# Patient Record
Sex: MALE | Race: WHITE | NOT HISPANIC OR LATINO | Employment: OTHER | URBAN - METROPOLITAN AREA
[De-identification: names, ages, dates, MRNs, and addresses within clinical notes are randomized per-mention and may not be internally consistent; named-entity substitution may affect disease eponyms.]

---

## 2018-09-15 ENCOUNTER — HOSPITAL ENCOUNTER (EMERGENCY)
Facility: HOSPITAL | Age: 43
Discharge: HOME | End: 2018-09-16
Attending: EMERGENCY MEDICINE
Payer: OTHER GOVERNMENT

## 2018-09-15 DIAGNOSIS — F43.20 ADJUSTMENT DISORDER, UNSPECIFIED TYPE: Primary | ICD-10-CM

## 2018-09-15 DIAGNOSIS — F43.22 ADJUSTMENT DISORDER WITH ANXIETY: ICD-10-CM

## 2018-09-15 DIAGNOSIS — Z86.59 HISTORY OF SUICIDAL IDEATION: ICD-10-CM

## 2018-09-15 LAB
ALBUMIN SERPL BCP-MCNC: 4.4 G/DL
ALP SERPL-CCNC: 82 U/L
ALT SERPL W/O P-5'-P-CCNC: 109 U/L
AMORPH CRY URNS QL MICRO: ABNORMAL
AMPHET+METHAMPHET UR QL: NEGATIVE
ANION GAP SERPL CALC-SCNC: 16 MMOL/L
APAP SERPL-MCNC: <3 UG/ML
AST SERPL-CCNC: 66 U/L
BACTERIA #/AREA URNS HPF: ABNORMAL /HPF
BARBITURATES UR QL SCN>200 NG/ML: NEGATIVE
BASOPHILS # BLD AUTO: 0.1 K/UL
BASOPHILS NFR BLD: 0.9 %
BENZODIAZ UR QL SCN>200 NG/ML: NEGATIVE
BILIRUB SERPL-MCNC: 1 MG/DL
BILIRUB UR QL STRIP: NEGATIVE
BUN SERPL-MCNC: 8 MG/DL
BZE UR QL SCN: NEGATIVE
CALCIUM SERPL-MCNC: 9.4 MG/DL
CANNABINOIDS UR QL SCN: NEGATIVE
CHLORIDE SERPL-SCNC: 107 MMOL/L
CLARITY UR: CLEAR
CO2 SERPL-SCNC: 17 MMOL/L
COLOR UR: YELLOW
CREAT SERPL-MCNC: 0.9 MG/DL
CREAT UR-MCNC: 250.3 MG/DL
DIFFERENTIAL METHOD: ABNORMAL
EOSINOPHIL # BLD AUTO: 0.2 K/UL
EOSINOPHIL NFR BLD: 2.8 %
ERYTHROCYTE [DISTWIDTH] IN BLOOD BY AUTOMATED COUNT: 12.6 %
EST. GFR  (AFRICAN AMERICAN): >60 ML/MIN/1.73 M^2
EST. GFR  (NON AFRICAN AMERICAN): >60 ML/MIN/1.73 M^2
ETHANOL SERPL-MCNC: 136 MG/DL
ETHANOL SERPL-MCNC: 78 MG/DL
GLUCOSE SERPL-MCNC: 127 MG/DL
GLUCOSE UR QL STRIP: NEGATIVE
GRAN CASTS #/AREA URNS LPF: 2 /LPF
HCT VFR BLD AUTO: 50.6 %
HGB BLD-MCNC: 17.4 G/DL
HGB UR QL STRIP: NEGATIVE
HYALINE CASTS #/AREA URNS LPF: 45 /LPF
KETONES UR QL STRIP: NEGATIVE
LEUKOCYTE ESTERASE UR QL STRIP: NEGATIVE
LYMPHOCYTES # BLD AUTO: 3.2 K/UL
LYMPHOCYTES NFR BLD: 36.1 %
MCH RBC QN AUTO: 32.5 PG
MCHC RBC AUTO-ENTMCNC: 34.5 G/DL
MCV RBC AUTO: 94 FL
METHADONE UR QL SCN>300 NG/ML: NEGATIVE
MICROSCOPIC COMMENT: ABNORMAL
MONOCYTES # BLD AUTO: 0.6 K/UL
MONOCYTES NFR BLD: 6.2 %
NEUTROPHILS # BLD AUTO: 4.9 K/UL
NEUTROPHILS NFR BLD: 54 %
NITRITE UR QL STRIP: NEGATIVE
OPIATES UR QL SCN: NEGATIVE
PCP UR QL SCN>25 NG/ML: NEGATIVE
PH UR STRIP: 6 [PH] (ref 5–8)
PLATELET # BLD AUTO: 293 K/UL
PMV BLD AUTO: 7.9 FL
POTASSIUM SERPL-SCNC: 4 MMOL/L
PROT SERPL-MCNC: 7.9 G/DL
PROT UR QL STRIP: ABNORMAL
RBC # BLD AUTO: 5.36 M/UL
RBC #/AREA URNS HPF: 2 /HPF (ref 0–4)
SODIUM SERPL-SCNC: 140 MMOL/L
SP GR UR STRIP: 1.02 (ref 1–1.03)
TOXICOLOGY INFORMATION: NORMAL
TSH SERPL DL<=0.005 MIU/L-ACNC: 0.72 UIU/ML
URN SPEC COLLECT METH UR: ABNORMAL
UROBILINOGEN UR STRIP-ACNC: NEGATIVE EU/DL
WBC # BLD AUTO: 9 K/UL
WBC #/AREA URNS HPF: 2 /HPF (ref 0–5)

## 2018-09-15 PROCEDURE — 85025 COMPLETE CBC W/AUTO DIFF WBC: CPT

## 2018-09-15 PROCEDURE — 80329 ANALGESICS NON-OPIOID 1 OR 2: CPT

## 2018-09-15 PROCEDURE — 25000003 PHARM REV CODE 250: Performed by: EMERGENCY MEDICINE

## 2018-09-15 PROCEDURE — 80307 DRUG TEST PRSMV CHEM ANLYZR: CPT

## 2018-09-15 PROCEDURE — 36415 COLL VENOUS BLD VENIPUNCTURE: CPT

## 2018-09-15 PROCEDURE — 82375 ASSAY CARBOXYHB QUANT: CPT

## 2018-09-15 PROCEDURE — 80053 COMPREHEN METABOLIC PANEL: CPT

## 2018-09-15 PROCEDURE — 93005 ELECTROCARDIOGRAM TRACING: CPT

## 2018-09-15 PROCEDURE — S4991 NICOTINE PATCH NONLEGEND: HCPCS | Performed by: EMERGENCY MEDICINE

## 2018-09-15 PROCEDURE — 99284 EMERGENCY DEPT VISIT MOD MDM: CPT | Mod: 25

## 2018-09-15 PROCEDURE — 81000 URINALYSIS NONAUTO W/SCOPE: CPT

## 2018-09-15 PROCEDURE — 84443 ASSAY THYROID STIM HORMONE: CPT

## 2018-09-15 PROCEDURE — 80320 DRUG SCREEN QUANTALCOHOLS: CPT

## 2018-09-15 RX ORDER — LORAZEPAM 1 MG/1
1 TABLET ORAL
Status: COMPLETED | OUTPATIENT
Start: 2018-09-15 | End: 2018-09-15

## 2018-09-15 RX ORDER — IBUPROFEN 200 MG
1 TABLET ORAL
Status: DISCONTINUED | OUTPATIENT
Start: 2018-09-15 | End: 2018-09-16 | Stop reason: HOSPADM

## 2018-09-15 RX ADMIN — LORAZEPAM 1 MG: 1 TABLET ORAL at 10:09

## 2018-09-15 RX ADMIN — NICOTINE 1 PATCH: 21 PATCH, EXTENDED RELEASE TRANSDERMAL at 10:09

## 2018-09-15 NOTE — ED NOTES
Faxed packet to [facility], [facility], [facility], [facility], [facility], [facility], [facility], [facility], [facility], [facility]

## 2018-09-15 NOTE — ED NOTES
Pt is now more calm and cooperative, pt is answering questions and interacting with ER Staff ad breana.

## 2018-09-15 NOTE — ED NOTES
Faxed packet to  [facility], [facility], [facility], [facility], [facility], [facility], [facility], [facility], [facility], [facility], [facility], [facility], [facility],

## 2018-09-15 NOTE — ED NOTES
PEC packet faxed to: [facility], [facility], [facility], [facility],  [facility], [facility],  [facility]

## 2018-09-15 NOTE — ED NOTES
Assumed care from Tristan:  Patient awake, alert and oriented x 3, skin warm and dry, in NAD with family at bedside.

## 2018-09-15 NOTE — ED PROVIDER NOTES
"Encounter Date: 9/15/2018    SCRIBE #1 NOTE: I, Renetta Beth , am scribing for, and in the presence of, .       History     Chief Complaint   Patient presents with    Suicide Attempt     Placed a garden hose in vehicle last night in an attempt to commit suicide.        Time seen by provider: 11:52 AM on 09/15/2018    Oliver Vargas is a 42 y.o. male with HTN who presents to the ED via [place] [employer] for a mental health evaluation. Patient states he "did not want to wake up" and attempted to commit suicide by putting a hose in his vehicle. The  report they found him in the car and the patient stated "I want to go inside and end it all", therefore police restrained him and he was brought to the ED. Patient is largely unwilling to participate In conversation and thus HPI and ROS are limited. Wife called the ED at 12:39 and states she is unsure who called the police, the patient does not wish to disclose any information to her.         The history is provided by the patient, the police and the spouse. No  was used.     Review of patient's allergies indicates:  No Known Allergies  Past Medical History:   Diagnosis Date    Hypertension      No past surgical history on file.  No family history on file.  Social History     Tobacco Use    Smoking status: Not on file   Substance Use Topics    Alcohol use: Not on file    Drug use: Not on file     Review of Systems   Unable to perform ROS: Psychiatric disorder   Psychiatric/Behavioral: Positive for dysphoric mood and suicidal ideas.       Physical Exam     Initial Vitals   BP Pulse Resp Temp SpO2   09/15/18 1145 09/15/18 1145 09/15/18 1144 09/15/18 1144 09/15/18 1144   (!) 195/130 (!) 138 20 98.3 °F (36.8 °C) 99 %      MAP       --                Physical Exam    Nursing note and vitals reviewed.  Constitutional: He appears well-developed and well-nourished.  Non-toxic appearance. No distress.   HENT:   Head: Normocephalic and " atraumatic.   Eyes: EOM are normal. Pupils are equal, round, and reactive to light.   Neck: Normal range of motion. Neck supple. No neck rigidity. No JVD present.   Cardiovascular: Normal rate, regular rhythm, normal heart sounds and intact distal pulses. Exam reveals no gallop and no friction rub.    No murmur heard.  Pulmonary/Chest: Breath sounds normal. He has no wheezes. He has no rhonchi. He has no rales.   Abdominal: Soft. Bowel sounds are normal. He exhibits no distension. There is no tenderness. There is no rigidity, no rebound and no guarding.   Musculoskeletal: Normal range of motion.   Neurological: He is alert and oriented to person, place, and time. He has normal strength and normal reflexes. No cranial nerve deficit or sensory deficit. He exhibits normal muscle tone. Coordination normal. GCS eye subscore is 4. GCS verbal subscore is 5. GCS motor subscore is 6.   Skin: Skin is warm and dry.   Psychiatric: He has a normal mood and affect. His speech is normal and behavior is normal. He is not actively hallucinating.         ED Course   Procedures  Labs Reviewed   CBC W/ AUTO DIFFERENTIAL - Abnormal; Notable for the following components:       Result Value    MCH 32.5 (*)     MPV 7.9 (*)     All other components within normal limits   COMPREHENSIVE METABOLIC PANEL - Abnormal; Notable for the following components:    CO2 17 (*)     Glucose 127 (*)     AST 66 (*)      (*)     All other components within normal limits   URINALYSIS, REFLEX TO URINE CULTURE - Abnormal; Notable for the following components:    Protein, UA 1+ (*)     All other components within normal limits    Narrative:     Preferred Collection Type->Urine, Clean Catch   ALCOHOL,MEDICAL (ETHANOL) - Abnormal; Notable for the following components:    Alcohol, Medical, Serum 136 (*)     All other components within normal limits   ACETAMINOPHEN LEVEL - Abnormal; Notable for the following components:    Acetaminophen (Tylenol), Serum <3.0  (*)     All other components within normal limits   URINALYSIS MICROSCOPIC - Abnormal; Notable for the following components:    Bacteria, UA Few (*)     Hyaline Casts, UA 45 (*)     Granular Casts, UA 2 (*)     All other components within normal limits    Narrative:     Preferred Collection Type->Urine, Clean Catch   ALCOHOL,MEDICAL (ETHANOL) - Abnormal; Notable for the following components:    Alcohol, Medical, Serum 78 (*)     All other components within normal limits   TSH   DRUG SCREEN PANEL, URINE EMERGENCY    Narrative:     Preferred Collection Type->Urine, Clean Catch   CARBON MONOXIDE, BLOOD     EKG Readings: (Independently Interpreted)   Sinus tachycardia. Rate of 103. Possible Left atrial enlargement. Normal axis. Normal intervals.        Imaging Results    None          Medical Decision Making:   History:   Old Medical Records: I decided to obtain old medical records.  Initial Assessment:   This is an emergent evaluation for psychiatric evaluation.  The pt presents for evaluation of suicide attempt by taking garden hose to connect to his car and run it through the window.    I feel the pt is suicidal and pt was placed under PEC with direct observation.  Medical workup was initiated to evaluate for organic etiologies.  Pt's etoh level was elevated, repeat alcohol was normal.  I do not believe the pt has metabolic encephalopathy, CVA, severe electrolyte derrangement, drug induced temporary psychosis.  He has no hypoxia, he is not short of breath.  He has been resting and sleeping comfortably.  Patient is medically clear for transfer to psychiatric facility    Clinical Tests:   Lab Tests: Ordered and Reviewed  Medical Tests: Ordered and Reviewed  ED Management:  On reassessment patient states he is no longer feeling suicidal.  He was intoxicated and does not remember well last night.  He was awoken by the police officers this morning when they came into his house.  It seems that they drove by the house and  Sol garden hose stocking to the automobile exhaust.  The patient was handcuffed and brought to the hospital for possible suicide attempt.  Patient denies wanting to commit suicide now that he is sober.  I will obtain a psychiatric consult for further evaluation.            Scribe Attestation:   Scribe #1: I performed the above scribed service and the documentation accurately describes the services I performed. I attest to the accuracy of the note.    I, Selvin Humphrey, personally performed the services described in this documentation. All medical record entries made by the scribe were at my direction and in my presence.  I have reviewed the chart and agree that the record reflects my personal performance and is accurate and complete. Darryn Montalvo MD.  4:00 PM 09/15/2018             Clinical Impression:   The encounter diagnosis was Suicide attempt.                             Darryn Montalvo MD  09/15/18 [id number]       Darryn Montalvo MD  09/15/18 6937

## 2018-09-16 VITALS
TEMPERATURE: 98 F | HEIGHT: 70 IN | DIASTOLIC BLOOD PRESSURE: 100 MMHG | HEART RATE: 110 BPM | RESPIRATION RATE: 20 BRPM | SYSTOLIC BLOOD PRESSURE: 138 MMHG | WEIGHT: 220 LBS | BODY MASS INDEX: 31.5 KG/M2 | OXYGEN SATURATION: 97 %

## 2018-09-16 PROCEDURE — G0425 INPT/ED TELECONSULT30: HCPCS | Mod: GT,,, | Performed by: PSYCHIATRY & NEUROLOGY

## 2018-09-16 PROCEDURE — 99499 UNLISTED E&M SERVICE: CPT | Mod: GT,,, | Performed by: PSYCHIATRY & NEUROLOGY

## 2018-09-16 NOTE — ED NOTES
Admit packet refaxed to the following facilities: [facility], [facility], [facility], [facility], [facility], [facility], [facility], [facility], [facility], [facility], [facility], [facility], [facility], [facility], [facility], [facility], [facility], [facility], [facility], [facility], [facility], [facility], [facility], [facility], [facility], [facility], [facility], [facility], [facility], [facility] and [facility].
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
## 2018-09-17 LAB — COHGB MFR BLD: 5 %

## 2020-02-13 ENCOUNTER — OFFICE VISIT (OUTPATIENT)
Dept: FAMILY MEDICINE | Facility: CLINIC | Age: 45
End: 2020-02-13
Payer: OTHER GOVERNMENT

## 2020-02-13 VITALS
DIASTOLIC BLOOD PRESSURE: 90 MMHG | BODY MASS INDEX: 34.22 KG/M2 | OXYGEN SATURATION: 96 % | SYSTOLIC BLOOD PRESSURE: 124 MMHG | HEIGHT: 70 IN | HEART RATE: 116 BPM | WEIGHT: 239 LBS

## 2020-02-13 DIAGNOSIS — I10 ESSENTIAL HYPERTENSION: ICD-10-CM

## 2020-02-13 DIAGNOSIS — M77.8 LEFT SHOULDER TENDINITIS: Primary | ICD-10-CM

## 2020-02-13 DIAGNOSIS — Z00.00 PREVENTATIVE HEALTH CARE: ICD-10-CM

## 2020-02-13 PROCEDURE — 99203 OFFICE O/P NEW LOW 30 MIN: CPT | Mod: S$GLB,,, | Performed by: NURSE PRACTITIONER

## 2020-02-13 PROCEDURE — 99203 PR OFFICE/OUTPT VISIT, NEW, LEVL III, 30-44 MIN: ICD-10-PCS | Mod: S$GLB,,, | Performed by: NURSE PRACTITIONER

## 2020-02-13 RX ORDER — LISINOPRIL 10 MG/1
10 TABLET ORAL DAILY
Qty: 30 TABLET | Refills: 0 | Status: SHIPPED | OUTPATIENT
Start: 2020-02-13 | End: 2020-03-10 | Stop reason: SDUPTHER

## 2020-02-13 RX ORDER — METHYLPREDNISOLONE 4 MG/1
TABLET ORAL
Qty: 1 PACKAGE | Refills: 0 | Status: SHIPPED | OUTPATIENT
Start: 2020-02-13 | End: 2020-03-05

## 2020-02-13 NOTE — PATIENT INSTRUCTIONS
Shoulder Pain with Uncertain Cause  Shoulder pain can have many causes. Pain often comes from the structures that surround the shoulder joint. These are the joint capsule, ligaments, tendons, muscles, and bursa. Pain can also come from cartilage in the joint. Cartilage can become worn out or injured. Its important to know whats causing your pain so the healthcare provider can use the correct treatment. But sometimes its difficult to find the exact cause of shoulder pain. You may need to see a specialist (orthopedist). You may also need special tests such as a CT scan or MRI. The provider may need to use special tools to look inside the joint (arthroscopy).  Shoulder pain can be treated with a sling or a device that keeps your shoulder from moving. You can take an anti-inflammatory medicine such as ibuprofen to ease pain. You may need to do special shoulder exercises. Follow up with a specialist if the pain is severe or doesnt go away after a few weeks.  Home care  Follow these tips when caring for yourself at home:  · If a sling was given to you, leave it in place for the time advised by your healthcare provider. If you arent sure how long to wear it, ask for advice. If the sling becomes loose, adjust it so that your forearm is level with the ground. Your shoulder should feel well supported.  · Put an ice pack on the injured area for 20 minutes every 1 to 2 hours the first day. You can make your own ice pack by putting ice cubes in a plastic bag. Wrap the bag in a thin towel. Continue with ice packs 3 to 4 times a day for the next 2 days. Then use the pack as needed to ease pain and swelling.  · You may use acetaminophen or ibuprofen to control pain, unless another pain medicine was prescribed. If you have chronic liver or kidney disease, talk with your healthcare provider before using these medicines. Also talk with your provider if youve ever had a stomach ulcer or GI bleeding.  · Shoulder pain may seem  worse at night, when there is less to distract you from the pain. If you sleep on your side, try to keep weight off your painful shoulder. Propping pillows behind you may stop you from rolling over onto that shoulder during sleep.   · Shoulder and elbow joints can become stiff if left in a sling for too long. You should start range of motion exercises about 7 to 10 days after the injury. Talk with your provider to find out what type of exercises to do and how soon to start.  · You can take the sling off to shower or bathe.  Follow-up care  Follow up with your healthcare provider if you dont start to get better in the next 5 days.  When to seek medical advice  Call your healthcare provider right away if any of these occur:  · Pain or swelling gets worse or continues for more than a few days  · Your hand or fingers become cold, blue, numb, or tingly  · Large amount of bruising on your shoulder or upper arm  · Difficulty moving your hand or fingers  · Weakness in your hand or fingers  · Your shoulder becomes stiff  · It feels like your shoulder is popping out  · You are less able to do your daily activities  Date Last Reviewed: 10/1/2016  © 5313-8918 [employer]. [address] [place] [address], [place] [address]. All rights reserved. This information is not intended as a substitute for professional medical care. Always follow your healthcare professional's instructions.

## 2020-02-13 NOTE — PROGRESS NOTES
SUBJECTIVE:      Patient ID: Oliver Vargas is a 44 y.o. male.    Chief Complaint: Shoulder Pain (lt)    Mr Vargas is here today complaining of left shoulder pain for 3 weeks. He does not remember injuring his shoulder just woke up with it hurting. [facility] but has not been [facility] in over a year. He is retired. Smokes cigarettes daily. Says he was told in the past he has borderline hypertension but was not treated for it    Shoulder Pain    The pain is present in the left shoulder. This is a new problem. The current episode started 1 to 4 weeks ago. The problem occurs daily. The problem has been gradually worsening. The pain is mild. Associated symptoms include a limited range of motion. Pertinent negatives include no headaches, inability to bear weight, stiffness or visual symptoms. The symptoms are aggravated by activity. He has tried nothing for the symptoms.       History reviewed. No pertinent surgical history.  History reviewed. No pertinent family history.   Social History     Socioeconomic History    Marital status:      Spouse name: Not on file    Number of children: Not on file    Years of education: Not on file    Highest education level: Not on file   Occupational History    Not on file   Social Needs    Financial resource strain: Not on file    Food insecurity:     Worry: Not on file     Inability: Not on file    Transportation needs:     Medical: Not on file     Non-medical: Not on file   Tobacco Use    Smoking status: Current Every Day Smoker    Smokeless tobacco: Never Used   Substance and Sexual Activity    Alcohol use: Yes    Drug use: Never    Sexual activity: Yes   Lifestyle    Physical activity:     Days per week: Not on file     Minutes per session: Not on file    Stress: Not on file   Relationships    Social connections:     Talks on phone: Not on file     Gets together: Not on file     Attends Protestant service: Not on file      "Active member of club or organization: Not on file     Attends meetings of clubs or organizations: Not on file     Relationship status: Not on file   Other Topics Concern    Not on file   Social History Narrative    Not on file     Current Outpatient Medications   Medication Sig Dispense Refill    lisinopril 10 MG tablet Take 1 tablet (10 mg total) by mouth once daily. 1/2 tab x7d then 1 tab 30 tablet 0    methylPREDNISolone (MEDROL DOSEPACK) 4 mg tablet use as directed 1 Package 0     No current facility-administered medications for this visit.      Review of patient's allergies indicates:  No Known Allergies   Past Medical History:   Diagnosis Date    Hypertension      History reviewed. No pertinent surgical history.    Review of Systems   Constitutional: Negative for appetite change, chills, diaphoresis and unexpected weight change.   HENT: Negative for ear discharge, facial swelling, hearing loss, nosebleeds and trouble swallowing.    Eyes: Negative for photophobia, pain and visual disturbance.   Respiratory: Negative for apnea, choking, shortness of breath and wheezing.    Cardiovascular: Negative for chest pain and palpitations.   Gastrointestinal: Negative for abdominal pain, blood in stool and vomiting.   Endocrine: Negative for polyphagia.   Genitourinary: Negative for difficulty urinating and hematuria.   Musculoskeletal: Positive for arthralgias (shoulder pain). Negative for gait problem, joint swelling and stiffness.   Skin: Negative for pallor.   Neurological: Negative for dizziness, seizures, speech difficulty, weakness, light-headedness and headaches.   Hematological: Does not bruise/bleed easily.   Psychiatric/Behavioral: Negative for agitation and confusion.      OBJECTIVE:      Vitals:    02/13/20 1034 02/13/20 1049   BP: (!) 126/90 (!) 124/90   Pulse: (!) 116    SpO2: 96%    Weight: 108.4 kg (239 lb)    Height: 5' 10" (1.778 m)      Physical Exam   Constitutional: He is oriented to person, " place, and time. He appears well-developed and well-nourished.   HENT:   Head: Atraumatic.   Mouth/Throat: Uvula is midline.   Eyes: Conjunctivae are normal.   Neck: Neck supple. No thyromegaly present.   Cardiovascular: Normal rate, regular rhythm, normal heart sounds and intact distal pulses.   Pulmonary/Chest: Effort normal and breath sounds normal.   Abdominal: Soft. Bowel sounds are normal. He exhibits no distension. There is no tenderness.   Musculoskeletal: He exhibits no edema.        Left shoulder: He exhibits decreased range of motion and tenderness.   Neck: FROM without pain, No pain with axial compression.   Shoulder with painful ROM. No deformities, strength normal, no vascular compromise. Negative empty can test     Neurological: He is alert and oriented to person, place, and time.   Skin: Skin is warm and dry.   Psychiatric: He has a normal mood and affect.   Nursing note and vitals reviewed.     Assessment:       1. Left shoulder tendinitis    2. Essential hypertension    3. Preventative health care        Plan:       Left shoulder tendinitis  -     methylPREDNISolone (MEDROL DOSEPACK) 4 mg tablet; use as directed  Dispense: 1 Package; Refill: 0    Essential hypertension  -     lisinopril 10 MG tablet; Take 1 tablet (10 mg total) by mouth once daily. 1/2 tab x7d then 1 tab  Dispense: 30 tablet; Refill: 0  -     Comprehensive metabolic panel; Future; Expected date: 02/13/2020  -     Lipid panel; Future; Expected date: 02/13/2020  -     TSH; Future; Expected date: 02/13/2020  -     Urinalysis; Future; Expected date: 02/13/2020    Preventative health care  -     CBC auto differential; Future; Expected date: 02/13/2020  -     Comprehensive metabolic panel; Future; Expected date: 02/13/2020  -     Lipid panel; Future; Expected date: 02/13/2020  -     TSH; Future; Expected date: 02/13/2020  -     Urinalysis; Future; Expected date: 02/13/2020        Follow up in about 4 weeks (around 3/12/2020) for htn .       2/13/2020 Dawit Ayon, APRN, FNP

## 2020-02-19 ENCOUNTER — TELEPHONE (OUTPATIENT)
Dept: FAMILY MEDICINE | Facility: CLINIC | Age: 45
End: 2020-02-19

## 2020-02-19 LAB
ALBUMIN SERPL-MCNC: 4.7 G/DL (ref 4–5)
ALBUMIN/GLOB SERPL: 1.8 {RATIO} (ref 1.2–2.2)
ALP SERPL-CCNC: 101 IU/L (ref 39–117)
ALT SERPL-CCNC: 401 IU/L (ref 0–44)
APPEARANCE UR: CLEAR
AST SERPL-CCNC: 217 IU/L (ref 0–40)
BASOPHILS # BLD AUTO: 0.1 X10E3/UL (ref 0–0.2)
BASOPHILS NFR BLD AUTO: 0 %
BILIRUB SERPL-MCNC: 0.9 MG/DL (ref 0–1.2)
BILIRUB UR QL STRIP: NEGATIVE
BUN SERPL-MCNC: 13 MG/DL (ref 6–24)
BUN/CREAT SERPL: 14 (ref 9–20)
CALCIUM SERPL-MCNC: 9.4 MG/DL (ref 8.7–10.2)
CHLORIDE SERPL-SCNC: 97 MMOL/L (ref 96–106)
CHOLEST SERPL-MCNC: 191 MG/DL (ref 100–199)
CO2 SERPL-SCNC: 20 MMOL/L (ref 20–29)
COLOR UR: YELLOW
CREAT SERPL-MCNC: 0.91 MG/DL (ref 0.76–1.27)
EOSINOPHIL # BLD AUTO: 0.2 X10E3/UL (ref 0–0.4)
EOSINOPHIL NFR BLD AUTO: 2 %
ERYTHROCYTE [DISTWIDTH] IN BLOOD BY AUTOMATED COUNT: 13.9 % (ref 11.6–15.4)
GLOBULIN SER CALC-MCNC: 2.6 G/DL (ref 1.5–4.5)
GLUCOSE SERPL-MCNC: 184 MG/DL (ref 65–99)
GLUCOSE UR QL: NEGATIVE
HCT VFR BLD AUTO: 52.3 % (ref 37.5–51)
HDLC SERPL-MCNC: 28 MG/DL
HGB BLD-MCNC: 18 G/DL (ref 13–17.7)
HGB UR QL STRIP: NEGATIVE
IMM GRANULOCYTES # BLD AUTO: 0.1 X10E3/UL (ref 0–0.1)
IMM GRANULOCYTES NFR BLD AUTO: 1 %
KETONES UR QL STRIP: NEGATIVE
LDLC SERPL CALC-MCNC: 130 MG/DL (ref 0–99)
LEUKOCYTE ESTERASE UR QL STRIP: NEGATIVE
LYMPHOCYTES # BLD AUTO: 2.6 X10E3/UL (ref 0.7–3.1)
LYMPHOCYTES NFR BLD AUTO: 20 %
MCH RBC QN AUTO: 33 PG (ref 26.6–33)
MCHC RBC AUTO-ENTMCNC: 34.4 G/DL (ref 31.5–35.7)
MCV RBC AUTO: 96 FL (ref 79–97)
MICRO URNS: NORMAL
MONOCYTES # BLD AUTO: 1.2 X10E3/UL (ref 0.1–0.9)
MONOCYTES NFR BLD AUTO: 10 %
NEUTROPHILS # BLD AUTO: 8.5 X10E3/UL (ref 1.4–7)
NEUTROPHILS NFR BLD AUTO: 67 %
NITRITE UR QL STRIP: NEGATIVE
PH UR STRIP: 6.5 [PH] (ref 5–7.5)
PLATELET # BLD AUTO: 243 X10E3/UL (ref 150–450)
POTASSIUM SERPL-SCNC: 4.4 MMOL/L (ref 3.5–5.2)
PROT SERPL-MCNC: 7.3 G/DL (ref 6–8.5)
PROT UR QL STRIP: NEGATIVE
RBC # BLD AUTO: 5.45 X10E6/UL (ref 4.14–5.8)
SODIUM SERPL-SCNC: 136 MMOL/L (ref 134–144)
SP GR UR: 1.02 (ref 1–1.03)
SPECIMEN STATUS REPORT: NORMAL
TRIGL SERPL-MCNC: 164 MG/DL (ref 0–149)
TSH SERPL DL<=0.005 MIU/L-ACNC: 1.25 UIU/ML (ref 0.45–4.5)
UROBILINOGEN UR STRIP-MCNC: 1 MG/DL (ref 0.2–1)
VLDLC SERPL CALC-MCNC: 33 MG/DL (ref 5–40)
WBC # BLD AUTO: 12.7 X10E3/UL (ref 3.4–10.8)

## 2020-02-19 NOTE — TELEPHONE ENCOUNTER
----- Message from Dawit Ayon NP sent at 2/19/2020  7:51 AM CST -----  Can we add an A1c, GGT, hepatitis panel, iron and ferritin

## 2020-02-22 LAB
FERRITIN SERPL-MCNC: NORMAL NG/ML
GGT SERPL-CCNC: NORMAL IU/L
HAV IGM SERPL QL IA: NORMAL
HBA1C MFR BLD: 7.1 % (ref 4.8–5.6)
HBV CORE IGM SERPL QL IA: NORMAL
HBV SURFACE AG SERPL QL IA: NORMAL
HCV AB S/CO SERPL IA: NORMAL
IRON SERPL-MCNC: NORMAL UG/DL
SPECIMEN STATUS REPORT: NORMAL

## 2020-02-24 ENCOUNTER — TELEPHONE (OUTPATIENT)
Dept: FAMILY MEDICINE | Facility: CLINIC | Age: 45
End: 2020-02-24

## 2020-02-24 DIAGNOSIS — R71.8 ELEVATED HEMATOCRIT: ICD-10-CM

## 2020-02-24 DIAGNOSIS — D58.2 ELEVATED HEMOGLOBIN: ICD-10-CM

## 2020-02-24 DIAGNOSIS — R79.89 ELEVATED LIVER FUNCTION TESTS: Primary | ICD-10-CM

## 2020-02-24 NOTE — TELEPHONE ENCOUNTER
----- Message from Dawit Ayon NP sent at 2/24/2020  7:46 AM CST -----  elevated liver function tests.  This can be caused by alcohol, certain medications, certain infections, and fatty infiltration of the liver.  NO alcohol, hydrate, no tylenol. In one week he needs a hepatic function panel, hep panel, ggt, iron an ferritin.

## 2020-03-04 ENCOUNTER — TELEPHONE (OUTPATIENT)
Dept: FAMILY MEDICINE | Facility: CLINIC | Age: 45
End: 2020-03-04

## 2020-03-04 DIAGNOSIS — D58.2 ELEVATED FERRITIN, HEMOGLOBIN, AND RED BLOOD CELL COUNT: Primary | ICD-10-CM

## 2020-03-04 DIAGNOSIS — R71.8 ELEVATED FERRITIN, HEMOGLOBIN, AND RED BLOOD CELL COUNT: Primary | ICD-10-CM

## 2020-03-04 DIAGNOSIS — R79.89 ELEVATED FERRITIN, HEMOGLOBIN, AND RED BLOOD CELL COUNT: Primary | ICD-10-CM

## 2020-03-04 LAB
ALBUMIN SERPL-MCNC: 4.1 G/DL (ref 4–5)
ALP SERPL-CCNC: 94 IU/L (ref 39–117)
ALT SERPL-CCNC: 121 IU/L (ref 0–44)
AST SERPL-CCNC: 55 IU/L (ref 0–40)
BILIRUB DIRECT SERPL-MCNC: 0.12 MG/DL (ref 0–0.4)
BILIRUB SERPL-MCNC: 0.3 MG/DL (ref 0–1.2)
FERRITIN SERPL-MCNC: 865 NG/ML (ref 30–400)
GGT SERPL-CCNC: 218 IU/L (ref 0–65)
HAV IGM SERPL QL IA: NEGATIVE
HBV CORE IGM SERPL QL IA: NEGATIVE
HBV SURFACE AG SERPL QL IA: NEGATIVE
HCV AB S/CO SERPL IA: <0.1 S/CO RATIO (ref 0–0.9)
IRON SERPL-MCNC: 81 UG/DL (ref 38–169)
PROT SERPL-MCNC: 7.1 G/DL (ref 6–8.5)

## 2020-03-04 NOTE — TELEPHONE ENCOUNTER
----- Message from Dawit Ayon NP sent at 3/4/2020  8:52 AM CST -----  Please let pt know we are adding additional labs due to abnormal results. Can you call the lab to add a hemochromatosis DNA analysis? And did they run the full hepatic function panel?

## 2020-03-04 NOTE — TELEPHONE ENCOUNTER
Lab could not abd the hemochromatosis lab. They did have results on the full hepatic function panel and are sending it over.

## 2020-03-10 ENCOUNTER — OFFICE VISIT (OUTPATIENT)
Dept: FAMILY MEDICINE | Facility: CLINIC | Age: 45
End: 2020-03-10
Payer: OTHER GOVERNMENT

## 2020-03-10 VITALS
BODY MASS INDEX: 34.22 KG/M2 | HEART RATE: 82 BPM | SYSTOLIC BLOOD PRESSURE: 120 MMHG | DIASTOLIC BLOOD PRESSURE: 80 MMHG | WEIGHT: 239 LBS | HEIGHT: 70 IN | OXYGEN SATURATION: 98 %

## 2020-03-10 DIAGNOSIS — R79.89 ELEVATED FERRITIN: ICD-10-CM

## 2020-03-10 DIAGNOSIS — I10 ESSENTIAL HYPERTENSION: ICD-10-CM

## 2020-03-10 DIAGNOSIS — E78.00 ELEVATED LDL CHOLESTEROL LEVEL: ICD-10-CM

## 2020-03-10 DIAGNOSIS — Z72.0 TOBACCO USE: ICD-10-CM

## 2020-03-10 DIAGNOSIS — R74.8 ELEVATED LIVER ENZYMES: ICD-10-CM

## 2020-03-10 DIAGNOSIS — E11.9 TYPE 2 DIABETES MELLITUS WITHOUT COMPLICATION, WITHOUT LONG-TERM CURRENT USE OF INSULIN: Primary | ICD-10-CM

## 2020-03-10 PROCEDURE — 99214 PR OFFICE/OUTPT VISIT, EST, LEVL IV, 30-39 MIN: ICD-10-PCS | Mod: S$GLB,,, | Performed by: NURSE PRACTITIONER

## 2020-03-10 PROCEDURE — 99214 OFFICE O/P EST MOD 30 MIN: CPT | Mod: S$GLB,,, | Performed by: NURSE PRACTITIONER

## 2020-03-10 RX ORDER — METFORMIN HYDROCHLORIDE 500 MG/1
500 TABLET, EXTENDED RELEASE ORAL
Qty: 90 TABLET | Refills: 0 | Status: SHIPPED | OUTPATIENT
Start: 2020-03-10 | End: 2020-06-10 | Stop reason: SDUPTHER

## 2020-03-10 RX ORDER — VARENICLINE TARTRATE 0.5 (11)-1
KIT ORAL
Qty: 1 PACKAGE | Refills: 0 | Status: SHIPPED | OUTPATIENT
Start: 2020-03-10 | End: 2020-06-10

## 2020-03-10 RX ORDER — LISINOPRIL 10 MG/1
10 TABLET ORAL DAILY
Qty: 90 TABLET | Refills: 0 | Status: SHIPPED | OUTPATIENT
Start: 2020-03-10 | End: 2020-06-10 | Stop reason: SDUPTHER

## 2020-03-10 NOTE — PROGRESS NOTES
SUBJECTIVE:      Patient ID: Oliver Vargas is a 44 y.o. male.    Chief Complaint: discuss lab results    Mr Guerra is here today to f/u on HTN and review labs. [facility] but has not been [facility] in over a year. He is retired. Smokes cigarettes daily. He has quit smoking in the past with chantix but started back 2 years ago. He is interested in quitting smoking. Liver enzymes were elevated on his labs, he says he is a recovering alcoholic and has been alcohol free for over a month. Work up on elevated liver enzymes revealed an elevated ferritin.  I tried to add a hemochromatosis DNA analysis, however the lab was unable to run it. His sugar was also elevated, A1c is 7.1. Family history of diabetes. He does not follow a healthy diet and does not exercise routinely.     Nicotine Dependence   Presents for initial visit. Symptoms include fatigue. Preferred tobacco types include cigarettes. Preferred cigarette types include filtered. Preferred strength is ultra light. Preferred brands include Cedar. His urge triggers include company of smokers. His first smoke is from 6 to 8 AM. He smokes 1 pack of cigarettes per day. He started smoking when he was >18 years old. Past treatments include varenicline. The treatment provided significant relief. Compliance with prior treatments has been good. Oliver has tried to quit 2 times. His past medical history is significant for alcohol abuse.   Diabetes   He presents for his initial diabetic visit. He has type 2 diabetes mellitus. His disease course has been stable. There are no hypoglycemic associated symptoms. Pertinent negatives for hypoglycemia include no confusion, pallor, seizures or speech difficulty. Associated symptoms include fatigue. Pertinent negatives for diabetes include no chest pain and no polyphagia. There are no hypoglycemic complications. Symptoms are stable. Risk factors for coronary artery disease include hypertension,  male sex, obesity, dyslipidemia, diabetes mellitus and family history. When asked about current treatments, none were reported.   Hypertension   This is a new problem. The current episode started 1 to 4 weeks ago. The problem is controlled. Pertinent negatives include no chest pain, palpitations, peripheral edema or shortness of breath. Risk factors for coronary artery disease include diabetes mellitus, dyslipidemia, obesity, male gender and family history. Past treatments include nothing.       History reviewed. No pertinent surgical history.  Family History   Problem Relation Age of Onset    Hypertension Mother     Diabetes type II Father     Heart disease Father       Social History     Socioeconomic History    Marital status:      Spouse name: Not on file    Number of children: Not on file    Years of education: Not on file    Highest education level: Not on file   Occupational History    Not on file   Social Needs    Financial resource strain: Not on file    Food insecurity:     Worry: Not on file     Inability: Not on file    Transportation needs:     Medical: Not on file     Non-medical: Not on file   Tobacco Use    Smoking status: Current Every Day Smoker    Smokeless tobacco: Never Used   Substance and Sexual Activity    Alcohol use: Yes    Drug use: Never    Sexual activity: Yes   Lifestyle    Physical activity:     Days per week: Not on file     Minutes per session: Not on file    Stress: Not on file   Relationships    Social connections:     Talks on phone: Not on file     Gets together: Not on file     Attends Jainism service: Not on file     Active member of club or organization: Not on file     Attends meetings of clubs or organizations: Not on file     Relationship status: Not on file   Other Topics Concern    Not on file   Social History Narrative    Not on file     Current Outpatient Medications   Medication Sig Dispense Refill    lisinopriL 10 MG tablet Take 1 tablet  "(10 mg total) by mouth once daily. 1/2 tab x7d then 1 tab 90 tablet 0    metFORMIN (GLUCOPHAGE-XR) 500 MG XR 24hr tablet Take 1 tablet (500 mg total) by mouth daily with breakfast. 90 tablet 0    varenicline (CHANTIX STARTING MONTH BOX) 0.5 mg (11)- 1 mg (42) tablet Take one 0.5mg tab by mouth once daily X3 days,then increase to one 0.5mg tab twice daily X4 days,then increase to one 1mg tab twice daily 1 Package 0     No current facility-administered medications for this visit.      Review of patient's allergies indicates:  No Known Allergies   Past Medical History:   Diagnosis Date    Hypertension      History reviewed. No pertinent surgical history.    Review of Systems   Constitutional: Positive for fatigue. Negative for appetite change, chills, diaphoresis and unexpected weight change.   HENT: Negative for ear discharge, facial swelling, hearing loss, nosebleeds and trouble swallowing.    Eyes: Negative for photophobia, pain and visual disturbance.   Respiratory: Negative for apnea, choking and shortness of breath.    Cardiovascular: Negative for chest pain and palpitations.   Gastrointestinal: Negative for blood in stool and vomiting.   Endocrine: Negative for polyphagia.   Genitourinary: Negative for difficulty urinating and hematuria.   Musculoskeletal: Negative for gait problem and joint swelling.   Skin: Negative for pallor.   Neurological: Negative for seizures and speech difficulty.   Hematological: Does not bruise/bleed easily.   Psychiatric/Behavioral: Negative for agitation and confusion.      OBJECTIVE:      Vitals:    03/10/20 1118   BP: 120/80   Pulse: 82   SpO2: 98%   Weight: 108.4 kg (239 lb)   Height: 5' 10" (1.778 m)     Physical Exam   Constitutional: He is oriented to person, place, and time. He appears well-developed and well-nourished.   HENT:   Head: Atraumatic.   Eyes: Conjunctivae are normal.   Neck: Neck supple.   Cardiovascular: Normal rate, regular rhythm, normal heart sounds and " intact distal pulses.   Pulmonary/Chest: Effort normal and breath sounds normal.   Abdominal: Soft. Bowel sounds are normal. He exhibits no distension.   Musculoskeletal: Normal range of motion.   Neurological: He is alert and oriented to person, place, and time.   Skin: Skin is warm and dry.   Psychiatric: He has a normal mood and affect.   Nursing note and vitals reviewed.      Telephone on 02/24/2020   Component Date Value Ref Range Status    Total Protein 03/03/2020 7.1  6.0 - 8.5 g/dL Final    Albumin 03/03/2020 4.1  4.0 - 5.0 g/dL Final    Total Bilirubin 03/03/2020 0.3  0.0 - 1.2 mg/dL Final    Bilirubin, Direct 03/03/2020 0.12  0.00 - 0.40 mg/dL Final    Comment: Sample is icteric. This may cause spurious decreases in CREAT,TProt,  CHOL, and TRIG (if ordered). Clinical correlation indicated.  **Verified by repeat analysis**      Alkaline Phosphatase 03/03/2020 94  39 - 117 IU/L Final    AST 03/03/2020 55* 0 - 40 IU/L Final    ALT 03/03/2020 121* 0 - 44 IU/L Final    Hep A IgM 03/03/2020 Negative  Negative Final    Hepatitis B Surface Ag 03/03/2020 Negative  Negative Final    Hep B C IgM 03/03/2020 Negative  Negative Final    Hep C Virus Ab Signal/Cutoff 03/03/2020 <0.1  0.0 - 0.9 s/co ratio Final    Comment:                                   Negative:     < 0.8                               Indeterminate: 0.8 - 0.9                                    Positive:     > 0.9   The CDC recommends that a positive HCV antibody result   be followed up with a HCV Nucleic Acid Amplification   test ([id number]).      GGT 03/03/2020 218* 0 - 65 IU/L Final    Iron 03/03/2020 81  38 - 169 ug/dL Final    Ferritin 03/03/2020 865* 30 - 400 ng/mL Final   Office Visit on 02/13/2020   Component Date Value Ref Range Status    WBC 02/18/2020 12.7* 3.4 - 10.8 x10E3/uL Final    RBC 02/18/2020 5.45  4.14 - 5.80 x10E6/uL Final    Hemoglobin 02/18/2020 18.0* 13.0 - 17.7 g/dL Final    Hematocrit 02/18/2020 52.3* 37.5 - 51.0  % Final    Mean Corpuscular Volume 02/18/2020 96  79 - 97 fL Final    Mean Corpuscular Hemoglobin 02/18/2020 33.0  26.6 - 33.0 pg Final    Mean Corpuscular Hemoglobin Conc 02/18/2020 34.4  31.5 - 35.7 g/dL Final    RDW 02/18/2020 13.9  11.6 - 15.4 % Final    Platelets 02/18/2020 243  150 - 450 x10E3/uL Final    Neutrophils 02/18/2020 67  Not Estab. % Final    Lymph% 02/18/2020 20  Not Estab. % Final    Mono% 02/18/2020 10  Not Estab. % Final    Eosinophil% 02/18/2020 2  Not Estab. % Final    Basophil% 02/18/2020 0  Not Estab. % Final    Neutrophils Absolute 02/18/2020 8.5* 1.4 - 7.0 x10E3/uL Final    Lymph # 02/18/2020 2.6  0.7 - 3.1 x10E3/uL Final    Mono # 02/18/2020 1.2* 0.1 - 0.9 x10E3/uL Final    Eos # 02/18/2020 0.2  0.0 - 0.4 x10E3/uL Final    Baso # 02/18/2020 0.1  0.0 - 0.2 x10E3/uL Final    Immature Granulocytes 02/18/2020 1  Not Estab. % Final    Immature Grans (Abs) 02/18/2020 0.1  0.0 - 0.1 x10E3/uL Final    Glucose 02/18/2020 184* 65 - 99 mg/dL Final    BUN, Bld 02/18/2020 13  6 - 24 mg/dL Final    Creatinine 02/18/2020 0.91  0.76 - 1.27 mg/dL Final    eGFR if non African American 02/18/2020 103  >59 mL/min/1.73 Final    eGFR if  02/18/2020 119  >59 mL/min/1.73 Final    BUN/Creatinine Ratio 02/18/2020 14  9 - 20 Final    Sodium 02/18/2020 136  134 - 144 mmol/L Final    Potassium 02/18/2020 4.4  3.5 - 5.2 mmol/L Final    Chloride 02/18/2020 97  96 - 106 mmol/L Final    CO2 02/18/2020 20  20 - 29 mmol/L Final    Calcium 02/18/2020 9.4  8.7 - 10.2 mg/dL Final    Total Protein 02/18/2020 7.3  6.0 - 8.5 g/dL Final    Albumin 02/18/2020 4.7  4.0 - 5.0 g/dL Final                  **Please note reference interval change**    Globulin, Total 02/18/2020 2.6  1.5 - 4.5 g/dL Final    Albumin/Globulin Ratio 02/18/2020 1.8  1.2 - 2.2 Final    Total Bilirubin 02/18/2020 0.9  0.0 - 1.2 mg/dL Final    Alkaline Phosphatase 02/18/2020 101  39 - 117 IU/L Final    AST  02/18/2020 217* 0 - 40 IU/L Final    ALT 02/18/2020 401* 0 - 44 IU/L Final    Cholesterol 02/18/2020 191  100 - 199 mg/dL Final    Triglycerides 02/18/2020 164* 0 - 149 mg/dL Final    HDL 02/18/2020 28* >39 mg/dL Final    VLDL Cholesterol Ryan 02/18/2020 33  5 - 40 mg/dL Final    LDL Calculated 02/18/2020 130* 0 - 99 mg/dL Final    TSH 02/18/2020 1.250  0.450 - 4.500 uIU/mL Final    Specific Harmon, UA 02/18/2020 1.018  1.005 - 1.030 Final    pH, UA 02/18/2020 6.5  5.0 - 7.5 Final    Color, UA 02/18/2020 Yellow  Yellow Final    Clarity, UA 02/18/2020 Clear  Clear Final    Leukocytes, UA 02/18/2020 Negative  Negative Final    Protein, UA 02/18/2020 Negative  Negative/Trace Final    Glucose, UA 02/18/2020 Negative  Negative Final    Ketones, UA 02/18/2020 Negative  Negative Final    Occult Blood UA 02/18/2020 Negative  Negative Final    Bilirubin, UA 02/18/2020 Negative  Negative Final    Urobilinogen, UA 02/18/2020 1.0  0.2 - 1.0 mg/dL Final    Nitrite, UA 02/18/2020 Negative  Negative Final    Microscopic Examination 02/18/2020 Comment   Final    Microscopic not indicated and not performed.    Specimen Status Report 02/18/2020 Comment   Final    Comment: Verbal Order  See below:  Comment:  Please provide requested information and fax to [phone].  The [place] Code of Federal Regulations requires a written and  signed request be forwarded to a laboratory following a verbal order  of a laboratory test.  Please assist us to meet this requirement and  to complete our records.  Date:______________________________  ICD-9/10 Diagnosis Code(s):___________________________________________  Physician or Authorized Designee:_____________________________________                                                Please Print  Physician or Authorized Designee Signature:  ______________________________________________________________________  Your Signature Confirms Your Order Of The Test(s)  Listed  Additional Test(s) Requested  Comment:  Test(s) added per Mattie Layne at account 02-  Logged by Jessy Mohr  Test# 504243 Hemoglobin A1c  Test# 905796 Ferritin, Serum  Test# 519382 Iron  Test# 315951 Hepatitis Panel (4)  Test# 001                           958 GGT  Diagnosis Codes Provided     R73.9      R74.8      D58.2      R71.8      Hep A IgM 02/18/2020 CANCELED   Final-Edited    Comment: Quantity was not sufficient for add-on test.    Result canceled by the ancillary.      Hepatitis B Surface Ag 02/18/2020 CANCELED   Final-Edited    Comment: Test not performed    Result canceled by the ancillary.      Hep B C IgM 02/18/2020 CANCELED   Final-Edited    Comment: Test not performed    Result canceled by the ancillary.      Hep C Virus Ab Signal/Cutoff 02/18/2020 CANCELED   Final-Edited    Comment: Test not performed    Result canceled by the ancillary.      Hemoglobin A1C 02/18/2020 7.1* 4.8 - 5.6 % Final    Comment:          Prediabetes: 5.7 - 6.4           Diabetes: >6.4           Glycemic control for adults with diabetes: <7.0      GGT 02/18/2020 CANCELED  IU/L Final-Edited    Comment: Quantity was not sufficient for add-on test.    Result canceled by the ancillary.      Iron 02/18/2020 CANCELED  ug/dL Final-Edited    Comment: Quantity was not sufficient for add-on test.    Result canceled by the ancillary.      Ferritin 02/18/2020 CANCELED  ng/mL Final-Edited    Comment: Quantity was not sufficient for add-on test.    Result canceled by the ancillary.      Specimen Status Report 02/18/2020 Comment   Final    Comment: Written Authorization  Written Authorization  Written Authorization Received.  Authorization received from MATTIE LAYNE 02-  Logged by Corine Clemons     ]  Assessment:       1. Type 2 diabetes mellitus without complication, without long-term current use of insulin    2. Elevated ferritin    3. Elevated liver enzymes    4. Tobacco use    5. Essential  hypertension    6. Elevated LDL cholesterol level        Plan:       Type 2 diabetes mellitus without complication, without long-term current use of insulin  -  start  metFORMIN (GLUCOPHAGE-XR) 500 MG XR 24hr tablet; Take 1 tablet (500 mg total) by mouth daily with breakfast.  Dispense: 90 tablet; Refill: 0  Risks of DM discussed. Types of treatment discussed. Encouraged exercise and diet. Stay away from sweets and high carb foods such as pasta, rice, bread, etc.  Will start medication to achieve optimal glucose and cholesterol control.  Discussed need for annual eye exam, seasonal  flu vaccine seasonally, and routine inspection of feet. Will defer statin until liver work up is complete    Elevated ferritin  -     Ambulatory referral/consult to Hematology / Oncology; Future; Expected date: 03/17/2020  Discussed low iron diet    Elevated liver enzymes  -     Ambulatory referral/consult to Gastroenterology; Future; Expected date: 03/17/2020    Tobacco use  -  start   varenicline (CHANTIX STARTING MONTH BOX) 0.5 mg (11)- 1 mg (42) tablet; Take one 0.5mg tab by mouth once daily X3 days,then increase to one 0.5mg tab twice daily X4 days,then increase to one 1mg tab twice daily  Dispense: 1 Package; Refill: 0    Essential hypertension  -     lisinopriL 10 MG tablet; Take 1 tablet (10 mg total) by mouth once daily. 1/2 tab x7d then 1 tab  Dispense: 90 tablet; Refill: 0    Elevated LDL cholesterol level  LDL is elevated; recommend high fiber, low fat diet, daily metamucil supplement and daily aerobic exercise      Follow up in about 3 months (around 6/10/2020) for DM .      3/10/2020 KANDI Pham, FNP

## 2020-03-10 NOTE — PATIENT INSTRUCTIONS
Ferritin (Blood)  Does this test have other names?  Serum ferritin level  What is this test?  This test measures how much iron is in your blood.  Ferritin is a protein that stores iron. Red blood cells need iron to form normally and carry oxygen around your body. Other parts of your body, such as your liver, bone marrow, and muscles, also need iron.  Low levels of ferritin lead to iron-deficiency anemia. This means you have too few red blood cells. Iron deficiency can come from a poor diet or blood loss. Or your body may have trouble absorbing iron from food. It would take a very poor diet for a healthy adult to get a nutritional iron deficiency. But a low iron level is the most common nutritional deficiency in children. Children need extra iron during times of rapid growth.  In adults, low iron levels usually happen because of long-term (chronic) blood loss. If you have ulcers or tumors in your gut, intestinal bleeding, or very heavy menstrual periods, you could lose more iron than you take in and develop an iron deficiency. This can also happen if you are pregnant or breastfeeding.  High levels of ferritin can damage your joints, heart, liver, and pancreas. Too much iron is most often caused by an inherited disease called hemochromatosis. Many people with this disease never have any symptoms, especially women who lose iron through menstruation. But men and some women slowly build up excess iron over the years. They may begin to feel joint and belly (abdominal) pain in their 20s or 30s. Heavy alcohol use increases the amount of iron that is absorbed.  Iron poisoning occurs when a large amount of iron is taken in all at once. This happens to children who accidentally take too many iron supplements.   Why do I need this test?  You may need this test if your healthcare provider thinks that you have low iron levels. Signs and symptoms include:  · Pale or yellow skin  · Extreme tiredness and dizziness  · Heavy  "menstrual cycles  · Bleeding in your digestive tract  · Blood in your stool  · Shortness of breath or chest pain, especially with activity  · Brittle nails or loss of hair  · Pounding or "whooshing" sound in your ears  Children who eat a lot of ice, and toddlers and babies who drink too much whole cow's milk may also get this test.  You might also have this test done to check your ferritin level after treatment for iron deficiency.  What other tests might I have along with this test?  Your healthcare provider may also order other blood tests. These include:  · Serum iron level to measure the iron in the liquid part of your blood.  · Total iron binding capacity, or TIBC, to measure the amount of transferrin in your blood. Transferrin is the protein that carries ferritin from the gut to the cells that need it.  · Hemoglobin and hematocrit to measure the number of your red blood cells.  · Complete blood count, or CBC, to look at many different qualities of your blood, including the size of cells.  · HFE gene test, to see if you have hemochromatosis.  · Zinc protoporphyrin to measure iron deficiency or levels of lead toxicity.  What do my test results mean?  Many things may affect your lab test results. These include the method each lab uses to do the test. Even if your test results are different from the normal value, you may not have a problem. To learn what the results mean for you, talk with your healthcare provider.  Results are given in nanograms per milliliter (ng/mL). The normal range for ferritin in your blood serum is:  · 12 to 300 ng/mL for adult males  · 10 to 150 ng/mL for adult females  · 25 to 200 ng/mL for newborns  · 200 to 600 ng/mL at 1 month old  · 50 to 200 ng/mL at 2 to 5 months old  · 7 to 142 ng/mL for children 6 months to 15 years  If your results are lower, it may mean that you have iron-deficiency anemia. Lower levels may also be caused by certain medicines. Antacids can cause absorption " problems, and nonsteroidal anti-inflammatory medicines can cause blood loss in your digestive tract.  If your results are higher, it may mean you have:  · Hyperthyroidism  · Hemochromatosis  · Liver disease  · Inflammatory diseases  · Cancer, such as leukemia, lymphoma, or breast carcinoma  You may also have higher levels if you are getting iron-replacement therapy or had a recent blood transfusion.  How is this test done?  The test requires a blood sample, which is drawn through a needle from a vein in your arm.  Does this test pose any risks?  Taking a blood sample with a needle carries risks that include bleeding, infection, bruising, or feeling dizzy. When the needle pricks your arm, you may feel a slight stinging sensation or pain. Afterward, the site may be slightly sore.  What might affect my test results?  Eating foods that are high in iron, including meat, leafy green vegetables, and beans, or taking iron supplements can affect your results. Drinking a lot of milk, donating blood frequently, and running long distances regularly can also affect your results.  How do I get ready for this test?  You don't need to prepare for this test.      © 2592-0544 [employer]. [address] [place], [place] [address]. All rights reserved. This information is not intended as a substitute for professional medical care. Always follow your healthcare professional's instructions.        Diabetes: Caring for Your Body    When you have diabetes, your body needs special care. This care helps you stay healthy and prevent complications. Exercise and healthy eating are a part of this. You can also protect yourself by taking special care of your feet, skin, and teeth.  Caring for your feet  Follow these tips to help keep your feet healthy:  · Check your feet every day for redness, blisters, cracks, dry skin, or numbness. Use a mirror to inspect the bottoms of your feet, if needed. Or, ask for help.  · Wash your feet in  warm (not hot) water. Dont soak them.  · Use an emery board to keep your toenails even with the ends of your toes. File away sharp edges. A podiatrist (foot doctor) may need to cut your toenails for you.  · Keep your skin soft and smooth by placing a thin layer of skin lotion on the tops and bottoms of your feet. Do not put lotion in between your toes.  · Always wear shoes or slippers, even inside your home. Make sure that shoes are properly fitted. Change your socks daily.  · Call your healthcare provider right away if your feet are numb or painful, or if a cut or sore doesnt heal within a few days.  Preventing skin infections  To prevent skin infections, bathe every day. Dry yourself well, especially between your toes. Wash any cuts with warm, soapy water and cover with a sterile bandage. Call your healthcare provider if a cut or sore doesn't heal in a few days, feels warm, itches, or has a bad odor.  Caring for your teeth  Follow these guidelines for healthy teeth:  · Brush your teeth twice daily.  · Floss your teeth daily.  · See your dentist at least twice yearly.  If you smoke, quit  Smoking is dangerous for everyone, especially people with diabetes. It can harm the blood vessels in your eyes, kidneys, and heart. It raises blood pressure. Smoking can also slow healing, so infections are more likely. Ask your healthcare provider about programs to help you stop smoking.   Date Last Reviewed: 3/1/2016  © 7289-6962 [employer]. [address] [place] [address], [place] [address]. All rights reserved. This information is not intended as a substitute for professional medical care. Always follow your healthcare professional's instructions.

## 2020-03-19 DIAGNOSIS — K76.89 OTHER SPECIFIED DISEASES OF LIVER: Primary | ICD-10-CM

## 2020-04-24 ENCOUNTER — OFFICE VISIT (OUTPATIENT)
Dept: HEMATOLOGY/ONCOLOGY | Facility: CLINIC | Age: 45
End: 2020-04-24
Payer: OTHER GOVERNMENT

## 2020-04-24 VITALS
SYSTOLIC BLOOD PRESSURE: 129 MMHG | HEART RATE: 88 BPM | RESPIRATION RATE: 12 BRPM | HEIGHT: 69 IN | TEMPERATURE: 98 F | BODY MASS INDEX: 34.29 KG/M2 | WEIGHT: 231.5 LBS | DIASTOLIC BLOOD PRESSURE: 87 MMHG

## 2020-04-24 DIAGNOSIS — R79.89 ELEVATED FERRITIN: ICD-10-CM

## 2020-04-24 DIAGNOSIS — G47.33 OBSTRUCTIVE SLEEP APNEA SYNDROME: Primary | ICD-10-CM

## 2020-04-24 DIAGNOSIS — F17.200 CURRENT EVERY DAY SMOKER: ICD-10-CM

## 2020-04-24 PROCEDURE — 99204 OFFICE O/P NEW MOD 45 MIN: CPT | Mod: S$GLB,,, | Performed by: INTERNAL MEDICINE

## 2020-04-24 PROCEDURE — 99204 PR OFFICE/OUTPT VISIT, NEW, LEVL IV, 45-59 MIN: ICD-10-PCS | Mod: S$GLB,,, | Performed by: INTERNAL MEDICINE

## 2020-04-26 NOTE — PROGRESS NOTES
[facility] hematology Oncology consultation in office note  April 24, 2020    Patient is following isolation precautions at home because of corona virus epidemic        Subjective:      Patient ID:   Oliver Vargas  44 y.o. male  1975  LINDA Ac Ed, MD    Chief Complaint:   Increased ferritin    HPI:  44 y.o. male sees Ms. Dawit AyonMELVIN for primary care over the last 6 months.  He is retired  over the last 1-1/2 years.    He has diabetes, hypertension, sleep apnea syndrome, GERD symptoms rarely, and has been found to have elevated LFTs.  Recent lab work showed a ferritin in the 865 range.    He has not had previous surgeries.    He smokes 1 pack per day times 25 years and continues to smoke.  He has tried to quit smoking in the past on several occasions without success.     He told me that he drank beer and was a Lush in the past, he drinks 6-8 beers once or twice per week now.    He denies allergies to medications.  In the service he worked with [employer] for greater than 20 years.    His mother had hypertension, diabetes, and increased iron.  His father had CABG surgery, renal cancer, and hypertension.  His brother had thyroid cancer.  Another brother is alive and well.  A sister has diabetes.  A son is alive and well and a daughter is obese.    He has seen Dr. Romero who has recommended him to decrease alcohol intake and decreased weight because of suspected fatty liver..      ROS:   GEN: normal without any fever, night sweats or weight loss  HEENT: normal with no HA's, sore throat, stiff neck, changes in vision  CV: normal with no CP, SOB, PND, FERNANDEZ or orthopnea  PULM: normal with no SOB, cough, hemoptysis, sputum or pleuritic pain.  See history of present illness  GI: normal with no abdominal pain, nausea, vomiting, constipation, diarrhea, melanotic stools, BRBPR, or hematemesis.  See history of present illness  : normal with no hematuria, dysuria  BREAST: normal with  no mass, discharge, pain  SKIN: normal with no rash, erythema, bruising, or swelling     Past Medical History:   Diagnosis Date    Hypertension      History reviewed. No pertinent surgical history.    Review of patient's allergies indicates:  No Known Allergies  Social History     Socioeconomic History    Marital status:      Spouse name: Not on file    Number of children: Not on file    Years of education: Not on file    Highest education level: Not on file   Occupational History    Not on file   Social Needs    Financial resource strain: Not on file    Food insecurity:     Worry: Not on file     Inability: Not on file    Transportation needs:     Medical: Not on file     Non-medical: Not on file   Tobacco Use    Smoking status: Current Every Day Smoker    Smokeless tobacco: Never Used   Substance and Sexual Activity    Alcohol use: Yes    Drug use: Never    Sexual activity: Yes   Lifestyle    Physical activity:     Days per week: Not on file     Minutes per session: Not on file    Stress: Not on file   Relationships    Social connections:     Talks on phone: Not on file     Gets together: Not on file     Attends Ten[employer] service: Not on file     Active member of club or organization: Not on file     Attends meetings of clubs or organizations: Not on file     Relationship status: Not on file   Other Topics Concern    Not on file   Social History Narrative    Not on file         Current Outpatient Medications:     lisinopriL 10 MG tablet, Take 1 tablet (10 mg total) by mouth once daily. 1/2 tab x7d then 1 tab, Disp: 90 tablet, Rfl: 0    metFORMIN (GLUCOPHAGE-XR) 500 MG XR 24hr tablet, Take 1 tablet (500 mg total) by mouth daily with breakfast., Disp: 90 tablet, Rfl: 0    varenicline (CHANTIX STARTING MONTH BOX) 0.5 mg (11)- 1 mg (42) tablet, Take one 0.5mg tab by mouth once daily X3 days,then increase to one 0.5mg tab twice daily X4 days,then increase to one 1mg tab twice daily (Patient  "not taking: Reported on 4/24/2020), Disp: 1 Package, Rfl: 0          Objective:   Vitals:  Blood pressure 129/87, pulse 88, temperature 97.7 °F (36.5 °C), temperature source Oral, resp. rate 12, height 5' 9" (1.753 m), weight 105 kg (231 lb 8 oz).    Physical Examination:   GEN: no apparent distress, comfortable  HEAD: atraumatic and normocephalic  EYES: no pallor, no icterus  ENT:  no pharyngeal erythema, external ears WNL; no nasal discharge  NECK: no masses, thyroid normal, trachea midline, no LAD/LN's, supple  CV: RRR with no murmur; normal pulse; normal S1 and S2; no pedal edema  CHEST: Normal respiratory effort; CTAB; normal breath sounds; no wheeze or crackles  ABDOM: nontender and nondistended; soft; normal bowel sounds; no rebound/guarding, L/S NP  MUSC/Skeletal: ROM normal; no crepitus; joints normal; no deformities or arthropathy  EXTREM: no clubbing, cyanosis, inflammation or swelling  SKIN: no rashes, lesions, ulcers, petechiae or subcutaneous nodules  : no palpable testicular mass  NEURO: grossly intact; motor/sensory WNL; no tremors  PSYCH: normal mood, affect and behavior  LYMPH: normal cervical, supraclavicular, axillary and groin LN's  BREASTS:  No palpable mass at left or right chest.    LFT's are slightly increased, total bilirubin level is normal.  His ferritin level is increased at 865.    Hemoglobin is 18 hematocrit is 52, white blood count is 12.7, MCV is 96, platelet count is 243,000, he has a normal differential.  Creatinine is 0.9, hepatitis serology studies are negative.    Labs:   Lab Results   Component Value Date    WBC 12.7 (H) 02/18/2020    HGB 18.0 (H) 02/18/2020    HCT 52.3 (H) 02/18/2020    MCV 96 02/18/2020     02/18/2020    CMP  Sodium   Date Value Ref Range Status   02/18/2020 136 134 - 144 mmol/L Final     Potassium   Date Value Ref Range Status   02/18/2020 4.4 3.5 - 5.2 mmol/L Final     Chloride   Date Value Ref Range Status   02/18/2020 97 96 - 106 mmol/L Final "     CO2   Date Value Ref Range Status   02/18/2020 20 20 - 29 mmol/L Final     Carbon Monoxide, Blood   Date Value Ref Range Status   09/15/2018 5 % Final     Comment:     -------------------REFERENCE VALUE--------------------------  0-2 Normal (Non-smoker) , < or = 9 Normal (Smoker), > or   = 20 (Toxic concentration)  Test Performed by:  [facility]  [address] [place] [address]       Glucose   Date Value Ref Range Status   02/18/2020 184 (H) 65 - 99 mg/dL Final     BUN, Bld   Date Value Ref Range Status   02/18/2020 13 6 - 24 mg/dL Final     Creatinine   Date Value Ref Range Status   02/18/2020 0.91 0.76 - 1.27 mg/dL Final     Calcium   Date Value Ref Range Status   02/18/2020 9.4 8.7 - 10.2 mg/dL Final     Total Protein   Date Value Ref Range Status   03/03/2020 7.1 6.0 - 8.5 g/dL Final     Albumin   Date Value Ref Range Status   03/03/2020 4.1 4.0 - 5.0 g/dL Final     Total Bilirubin   Date Value Ref Range Status   03/03/2020 0.3 0.0 - 1.2 mg/dL Final     Alkaline Phosphatase   Date Value Ref Range Status   03/03/2020 94 39 - 117 IU/L Final     AST   Date Value Ref Range Status   03/03/2020 55 (H) 0 - 40 IU/L Final     ALT   Date Value Ref Range Status   03/03/2020 121 (H) 0 - 44 IU/L Final     Anion Gap   Date Value Ref Range Status   09/15/2018 16 8 - 16 mmol/L Final     eGFR if    Date Value Ref Range Status   09/15/2018 >60 >60 mL/min/1.73 m^2 Final     eGFR if non    Date Value Ref Range Status   02/18/2020 103 >59 mL/min/1.73 Final     Abdominal ultrasound to check liver and spleen size has been ordered and is pending.    Assessment:   (1) 44 y.o. male has elevated LFTs with increased ferritin at 865 and long-term alcohol excess.  He may have fatty liver, chemical hepatitis, or cirrhosis at this point, ultrasound of lieu abdomen is pending.  He has been encouraged to stop drinking and lose weight per   Nathalie.    (2) the increased ferritin at 865 may be contributing to liver damage.  I have written orders to take off a pt of whole blood Q 2 weeks x2 months and then monthly thereafter.  This should gradually deplete  increased ferritin down to the normal range.  By history,  his mother also had increased iron, he believes.  This may be familial hemochromatosis.  Will request  hemochromatosis DNA studies when he follows up here.    (3) he has sleep apnea syndrome and at least a 25 year history of cigarette smoking.  He has expressed interest in trying to stop smoking.  I have placed a referral to the smoking cessation Clinic.    (4) he has secondary polycythemia from sleep apnea syndrome and perhaps COPD.  The above orders to draw off a pt of whole blood regularly should maintain his hemoglobin at approximately 15, and decrease future risk of CVA, AMI, DVT and other clot events.    (5) he needs to be evaluated for a CPAP machine, this may help in control of the secondary polycythemia with treatment of his sleep apnea syndrome.  Will try to refer him to a pulmonologist or sleep specialist who takes his  coverage.    He will begin his phlebotomy schedule as above at the blood center and follow up here in 2 months.              I have explained and the patient understands all of  the current recommendation(s). I have answered all of their questions to the best of my ability and to their complete satisfaction.

## 2020-05-13 ENCOUNTER — HOSPITAL ENCOUNTER (OUTPATIENT)
Dept: RADIOLOGY | Facility: HOSPITAL | Age: 45
Discharge: HOME | End: 2020-05-13
Attending: INTERNAL MEDICINE
Payer: OTHER GOVERNMENT

## 2020-05-13 DIAGNOSIS — K76.89 OTHER SPECIFIED DISEASES OF LIVER: ICD-10-CM

## 2020-05-13 PROCEDURE — 76700 US EXAM ABDOM COMPLETE: CPT | Mod: TC,PO

## 2020-06-10 ENCOUNTER — OFFICE VISIT (OUTPATIENT)
Dept: FAMILY MEDICINE | Facility: CLINIC | Age: 45
End: 2020-06-10
Payer: OTHER GOVERNMENT

## 2020-06-10 VITALS
HEART RATE: 107 BPM | SYSTOLIC BLOOD PRESSURE: 130 MMHG | DIASTOLIC BLOOD PRESSURE: 82 MMHG | TEMPERATURE: 98 F | HEIGHT: 69 IN | BODY MASS INDEX: 34.66 KG/M2 | WEIGHT: 234 LBS | OXYGEN SATURATION: 97 %

## 2020-06-10 DIAGNOSIS — E11.9 TYPE 2 DIABETES MELLITUS WITHOUT COMPLICATION, WITHOUT LONG-TERM CURRENT USE OF INSULIN: Primary | ICD-10-CM

## 2020-06-10 DIAGNOSIS — G47.30 SLEEP APNEA, UNSPECIFIED TYPE: ICD-10-CM

## 2020-06-10 DIAGNOSIS — I10 ESSENTIAL HYPERTENSION: ICD-10-CM

## 2020-06-10 PROCEDURE — 99214 OFFICE O/P EST MOD 30 MIN: CPT | Mod: S$GLB,,, | Performed by: NURSE PRACTITIONER

## 2020-06-10 PROCEDURE — 83036 POCT HEMOGLOBIN A1C: ICD-10-PCS | Mod: QW,,, | Performed by: NURSE PRACTITIONER

## 2020-06-10 PROCEDURE — 83036 HEMOGLOBIN GLYCOSYLATED A1C: CPT | Mod: QW,,, | Performed by: NURSE PRACTITIONER

## 2020-06-10 PROCEDURE — 99214 PR OFFICE/OUTPT VISIT, EST, LEVL IV, 30-39 MIN: ICD-10-PCS | Mod: S$GLB,,, | Performed by: NURSE PRACTITIONER

## 2020-06-10 RX ORDER — LISINOPRIL 10 MG/1
10 TABLET ORAL DAILY
Qty: 90 TABLET | Refills: 1 | Status: SHIPPED | OUTPATIENT
Start: 2020-06-10 | End: 2020-12-04

## 2020-06-10 RX ORDER — METFORMIN HYDROCHLORIDE 500 MG/1
500 TABLET, EXTENDED RELEASE ORAL
Qty: 90 TABLET | Refills: 1 | Status: SHIPPED | OUTPATIENT
Start: 2020-06-10 | End: 2020-12-04

## 2020-06-10 NOTE — PATIENT INSTRUCTIONS
4 Steps for Eating Healthier  Changing the way you eat can improve your health. It can lower your cholesterol and blood pressure, and help you stay at a healthy weight. Your diet doesnt have to be bland and boring to be healthy. Just watch your calories and follow these steps:    1. Eat fewer unhealthy fats  · Choose more fish and lean meats instead of fatty cuts of meat.  · Skip butter and lard, and use less margarine.  · Pass on foods that have palm, coconut, or hydrogenated oils.  · Eat fewer high-fat dairy foods like cheese, ice cream, and whole milk.  · Get a heart-healthy cookbook and try some low-fat recipes.  2. Go light on salt  · Keep the saltshaker off the table.  · Limit high-salt ingredients, such as soy sauce, bouillon, and garlic salt.  · Instead of adding salt when cooking, season your food with herbs and flavorings. Try lemon, garlic, and onion.  · Limit convenience foods, such as boxed or canned foods and restaurant food.  · Read food labels and choose lower-sodium options.  3. Limit sugar  · Pause before you add sugars to pancakes, cereal, coffee, or tea. This includes white and brown table sugar, syrup, honey, and molasses. Cut your usual amount by half.  · Use non-sugar sweeteners. Stevia, aspartame, and sucralose can satisfy a sweet tooth without adding calories.  · Swap out sugar-filled soda and other drinks. Buy sugar-free or low-calorie beverages. Remember water is always the best choice.  · Read labels and choose foods with less added sugar. Keep in mind that dairy foods and foods with fruit will have some natural sugar.  · Cut the sugar in recipes by 1/3 to 1/2. Boost the flavor with extracts like almond, vanilla, or orange. Or add spices such as cinnamon or nutmeg.  4. Eat more fiber  · Eat fresh fruits and vegetables every day.  · Boost your diet with whole grains. Go for oats, whole-grain rice, and bran.  · Add beans and lentils to your meals.  · Drink more water to match your fiber  increase. This is to help prevent constipation.  Date Last Reviewed: 5/11/2015  © 2304-5107 [employer]. [address] [place] [address], [place] [address]. All rights reserved. This information is not intended as a substitute for professional medical care. Always follow your healthcare professional's instructions.

## 2020-06-10 NOTE — PROGRESS NOTES
SUBJECTIVE:      Patient ID: Oliver Vargas is a 44 y.o. male.    Chief Complaint: Diabetes    Mr Vargas is here today to f/u on DM, htn and tobacco use. He was started on metformin at his previous ov. A1c is 7.0 today. Doing well on the medication. He is now following with Dr Huber for elevated LFTs and Dr Moore for elevated ferritin. Insurance did not pay for the chantix, he is smoking about 8 cigarettes daily. He has a history of sleep apnea, [facility] but he never did get the CPAP. Needs referral for new testing and supplies    Nicotine Dependence   Presents for follow-up visit. Symptoms include fatigue. Preferred tobacco types include cigarettes. Preferred cigarette types include filtered. Preferred strength is ultra light. Preferred brands include Meigs. His urge triggers include company of smokers. The symptoms have been improving. His first smoke is from 6 to 8 AM. He smokes < 1/2 a pack of cigarettes per day. He started smoking when he was >18 years old. Past treatments include varenicline. The treatment provided significant relief. Compliance with prior treatments has been good. Oliver has tried to quit 2 times. His past medical history is significant for alcohol abuse.   Diabetes   He presents for his follow-up diabetic visit. He has type 2 diabetes mellitus. His disease course has been stable. There are no hypoglycemic associated symptoms. Pertinent negatives for hypoglycemia include no confusion, dizziness, headaches, nervousness/anxiousness, pallor, seizures or speech difficulty. Associated symptoms include fatigue. Pertinent negatives for diabetes include no chest pain, no polyphagia and no weakness. There are no hypoglycemic complications. Symptoms are stable. Risk factors for coronary artery disease include hypertension, male sex, obesity, dyslipidemia, diabetes mellitus and family history. When asked about current treatments, none were reported.  He is following a generally healthy diet. An ACE inhibitor/angiotensin II receptor blocker is being taken. He does not see a podiatrist.Eye exam is not current.   Hypertension   The current episode started more than 1 month ago. The problem is controlled. Pertinent negatives include no chest pain, headaches, palpitations, peripheral edema or shortness of breath. Risk factors for coronary artery disease include diabetes mellitus, dyslipidemia, obesity, male gender and family history. Past treatments include ACE inhibitors. The current treatment provides moderate improvement.       History reviewed. No pertinent surgical history.  Family History   Problem Relation Age of Onset    Hypertension Mother     Diabetes type II Father     Heart disease Father       Social History     Socioeconomic History    Marital status:      Spouse name: Not on file    Number of children: Not on file    Years of education: Not on file    Highest education level: Not on file   Occupational History    Not on file   Social Needs    Financial resource strain: Not on file    Food insecurity:     Worry: Not on file     Inability: Not on file    Transportation needs:     Medical: Not on file     Non-medical: Not on file   Tobacco Use    Smoking status: Current Every Day Smoker    Smokeless tobacco: Never Used   Substance and Sexual Activity    Alcohol use: Yes    Drug use: Never    Sexual activity: Yes   Lifestyle    Physical activity:     Days per week: Not on file     Minutes per session: Not on file    Stress: Not on file   Relationships    Social connections:     Talks on phone: Not on file     Gets together: Not on file     Attends Restoration[employer] service: Not on file     Active member of club or organization: Not on file     Attends meetings of clubs or organizations: Not on file     Relationship status: Not on file   Other Topics Concern    Not on file   Social History Narrative    Not on file     Current Outpatient  "Medications   Medication Sig Dispense Refill    lisinopriL 10 MG tablet Take 1 tablet (10 mg total) by mouth once daily. 90 tablet 1    metFORMIN (GLUCOPHAGE-XR) 500 MG XR 24hr tablet Take 1 tablet (500 mg total) by mouth daily with breakfast. 90 tablet 1     No current facility-administered medications for this visit.      Review of patient's allergies indicates:  No Known Allergies   Past Medical History:   Diagnosis Date    Hypertension      History reviewed. No pertinent surgical history.    Review of Systems   Constitutional: Positive for fatigue. Negative for appetite change, chills, diaphoresis and unexpected weight change.   HENT: Negative for ear discharge, facial swelling, hearing loss, nosebleeds and trouble swallowing.    Eyes: Negative for photophobia, pain and visual disturbance.   Respiratory: Negative for apnea, choking, shortness of breath and wheezing.    Cardiovascular: Negative for chest pain and palpitations.   Gastrointestinal: Negative for abdominal pain, blood in stool and vomiting.   Endocrine: Negative for polyphagia.   Genitourinary: Negative for difficulty urinating and hematuria.   Musculoskeletal: Negative for gait problem and joint swelling.   Skin: Negative for pallor.   Neurological: Negative for dizziness, seizures, speech difficulty, weakness and headaches.   Hematological: Does not bruise/bleed easily.   Psychiatric/Behavioral: Negative for agitation, confusion and dysphoric mood. The patient is not nervous/anxious.       OBJECTIVE:      Vitals:    06/10/20 1101 06/10/20 1123   BP: (!) 130/90 130/82   Pulse: 107    Temp: 97.8 °F (36.6 °C)    TempSrc: Oral    SpO2: 97%    Weight: 106.1 kg (234 lb)    Height: 5' 9" (1.753 m)      Physical Exam   Constitutional: He is oriented to person, place, and time. He appears well-developed and well-nourished.   HENT:   Head: Atraumatic.   Eyes: Conjunctivae are normal.   Neck: Neck supple. No JVD present. No thyromegaly present. "   Cardiovascular: Normal rate, regular rhythm, normal heart sounds and intact distal pulses.   Pulmonary/Chest: Effort normal and breath sounds normal.   Abdominal: Soft. Bowel sounds are normal. He exhibits no distension. There is no tenderness.   Musculoskeletal: Normal range of motion. He exhibits no edema.        Right foot: There is no deformity.        Left foot: There is no deformity.   Feet:   Right Foot:   Protective Sensation: 10 sites tested. 10 sites sensed.   Skin Integrity: Negative for ulcer, blister or skin breakdown.   Left Foot:   Protective Sensation: 10 sites tested. 10 sites sensed.   Skin Integrity: Negative for ulcer, blister or skin breakdown.   Neurological: He is alert and oriented to person, place, and time.   Skin: Skin is warm and dry.   Psychiatric: He has a normal mood and affect. His behavior is normal. Thought content normal.   Nursing note and vitals reviewed.     Assessment:       1. Type 2 diabetes mellitus without complication, without long-term current use of insulin    2. Sleep apnea, unspecified type    3. Essential hypertension        Plan:       Type 2 diabetes mellitus without complication, without long-term current use of insulin  -     Hemoglobin A1C, POCT  -     metFORMIN (GLUCOPHAGE-XR) 500 MG XR 24hr tablet; Take 1 tablet (500 mg total) by mouth daily with breakfast.  Dispense: 90 tablet; Refill: 1  -     Foot Exam Performed  -     Ambulatory referral/consult to Ophthalmology; Future; Expected date: 06/17/2020  He is unable to take a statin at this time due to elevated liver enzymes. Low cholesterol diet discussed with patient    Sleep apnea, unspecified type  -     Ambulatory referral/consult to Pulmonology; Future; Expected date: 06/17/2020    Essential hypertension  -     lisinopriL 10 MG tablet; Take 1 tablet (10 mg total) by mouth once daily.  Dispense: 90 tablet; Refill: 1        Follow up in about 6 months (around 12/10/2020) for DM .      6/10/2020 Dawit Ayon  APRN, FNP

## 2020-06-24 ENCOUNTER — CLINICAL SUPPORT (OUTPATIENT)
Dept: SMOKING CESSATION | Facility: CLINIC | Age: 45
End: 2020-06-24
Payer: COMMERCIAL

## 2020-06-24 DIAGNOSIS — F17.200 NICOTINE DEPENDENCE: Primary | ICD-10-CM

## 2020-06-24 PROCEDURE — 99999 PR PBB SHADOW E&M-EST. PATIENT-LVL I: CPT | Mod: PBBFAC,,,

## 2020-06-24 PROCEDURE — 99999 PR PBB SHADOW E&M-EST. PATIENT-LVL I: ICD-10-PCS | Mod: PBBFAC,,,

## 2020-06-24 PROCEDURE — 99404 PREV MED CNSL INDIV APPRX 60: CPT | Mod: S$GLB,,,

## 2020-06-24 PROCEDURE — 99404 PR PREVENT COUNSEL,INDIV,60 MIN: ICD-10-PCS | Mod: S$GLB,,,

## 2020-06-24 RX ORDER — VARENICLINE TARTRATE 0.5 (11)-1
KIT ORAL
Qty: 53 TABLET | Refills: 0 | Status: SHIPPED | OUTPATIENT
Start: 2020-06-24 | End: 2020-07-22 | Stop reason: DRUGHIGH

## 2020-06-24 NOTE — Clinical Note
Patient is smoking about 20 cigarettes per day. He is ready to quit for health reasons. He has has several attempts to quit in the past. He will start his tobacco cessation regimen of Chantix starter pack once it is approved and received from the pharmacy. Patient will follow up in clinic on 7/8/2020.

## 2020-06-26 LAB — HBA1C MFR BLD: 7 %

## 2020-06-30 ENCOUNTER — OFFICE VISIT (OUTPATIENT)
Dept: HEMATOLOGY/ONCOLOGY | Facility: CLINIC | Age: 45
End: 2020-06-30
Payer: OTHER GOVERNMENT

## 2020-06-30 ENCOUNTER — OFFICE VISIT (OUTPATIENT)
Dept: PULMONOLOGY | Facility: CLINIC | Age: 45
End: 2020-06-30
Payer: OTHER GOVERNMENT

## 2020-06-30 VITALS
BODY MASS INDEX: 34.87 KG/M2 | RESPIRATION RATE: 16 BRPM | HEART RATE: 97 BPM | SYSTOLIC BLOOD PRESSURE: 131 MMHG | WEIGHT: 236.13 LBS | TEMPERATURE: 99 F | DIASTOLIC BLOOD PRESSURE: 85 MMHG

## 2020-06-30 VITALS
HEART RATE: 96 BPM | WEIGHT: 236 LBS | TEMPERATURE: 97 F | BODY MASS INDEX: 34.85 KG/M2 | DIASTOLIC BLOOD PRESSURE: 70 MMHG | OXYGEN SATURATION: 97 % | SYSTOLIC BLOOD PRESSURE: 102 MMHG

## 2020-06-30 DIAGNOSIS — I10 HYPERTENSION, UNSPECIFIED TYPE: ICD-10-CM

## 2020-06-30 DIAGNOSIS — E66.9 OBESITY (BMI 30.0-34.9): ICD-10-CM

## 2020-06-30 DIAGNOSIS — D75.1 POLYCYTHEMIA, SECONDARY: ICD-10-CM

## 2020-06-30 DIAGNOSIS — R79.89 ELEVATED FERRITIN: Primary | ICD-10-CM

## 2020-06-30 DIAGNOSIS — F17.219 CIGARETTE NICOTINE DEPENDENCE WITH NICOTINE-INDUCED DISORDER: ICD-10-CM

## 2020-06-30 DIAGNOSIS — G47.33 OSA (OBSTRUCTIVE SLEEP APNEA): Primary | ICD-10-CM

## 2020-06-30 PROCEDURE — 99203 OFFICE O/P NEW LOW 30 MIN: CPT | Mod: S$GLB,,, | Performed by: INTERNAL MEDICINE

## 2020-06-30 PROCEDURE — 99203 PR OFFICE/OUTPT VISIT, NEW, LEVL III, 30-44 MIN: ICD-10-PCS | Mod: S$GLB,,, | Performed by: INTERNAL MEDICINE

## 2020-06-30 PROCEDURE — 99214 OFFICE O/P EST MOD 30 MIN: CPT | Mod: S$GLB,,, | Performed by: INTERNAL MEDICINE

## 2020-06-30 PROCEDURE — 99214 PR OFFICE/OUTPT VISIT, EST, LEVL IV, 30-39 MIN: ICD-10-PCS | Mod: S$GLB,,, | Performed by: INTERNAL MEDICINE

## 2020-06-30 NOTE — PROGRESS NOTES
[facility]  Pulmonology  Initial Clinic Visit    Subjective   Reason for Referral:    Oliver Vargas is a 44 y.o. male referred by his PCP for evaluation of ROSA.    Chief Complaint   Patient presents with    Apnea     ref by garima       6/30/2020:  Sleep Apnea  Diagnosed with ROSA in the past but was never able to obtain a CPAP.  He presents for a sleep evaluation. He complains of snoring, decreased memory, decreased concentration, decreased sexual drive, impotence, excessive daytime sleepiness, falling asleep while reading, eating, during conversation.  Symptoms began 6 years ago, gradually worsening since that time.  He goes to sleep at 9:30 pm weekdays and 10 pm weekends. He awakens 6:30 am weekdays and 7 on weekends. He falls asleep in 45 minutes.  Collar size 26. He denies noisy environment, uncomfortable room temperature, uncomfortable bedding. Previous evaluation and treatment has included PSG with C PAP titration.    STOP-Bang Questionnaire  Results:  Your score is 7 / 8    You have answered Yes to 2 or more of 4 STOP questions + BMI > 35kg/m2  Therefore, you are at High Risk for Obstructive Sleep Apnea (ROSA)     Smoking History:  He began smoking 25 years ago. He currently smokes less than a pack per day.  He has attempted to quit smoking in the past, most recently 6 months ago. Best success quitting using nicotine patch. Barriers to quitting include nothing     Past Medical History:   Diagnosis Date    Hypertension      History reviewed. No pertinent surgical history.  Family History   Problem Relation Age of Onset    Hypertension Mother     Diabetes type II Father     Heart disease Father       Social History     Tobacco Use    Smoking status: Current Every Day Smoker     Packs/day: 0.50     Types: Cigarettes    Smokeless tobacco: Never Used   Substance and Sexual Activity    Alcohol use: Yes    Drug use: Never    Sexual activity: Yes      Allergies:  Patient has no known  allergies.     Outpatient Medications as of 6/30/2020   Medication    lisinopriL 10 MG tablet    metFORMIN (GLUCOPHAGE-XR) 500 MG XR 24hr tablet    varenicline (CHANTIX STARTING MONTH BOX) 0.5 mg (11)- 1 mg (42) tablet     No current facility-administered medications on file as of 6/30/2020.       Review of Systems   Constitutional: Negative for fever, chills, weight loss, weight gain and night sweats.   HENT: Negative for postnasal drip, voice change and congestion.    Eyes: Negative for redness and itching.   Respiratory: Positive for apnea. Negative for cough, sputum production, choking, wheezing and previous hospitialization due to pulmonary problems.    Cardiovascular: Negative for chest pain and palpitations.   Genitourinary: Negative for difficulty urinating and hematuria.   Endocrine: Negative for polydipsia, polyphagia and polyuria.    Musculoskeletal: Negative for gait problem, joint swelling and myalgias.   Skin: Negative for rash.   Gastrointestinal: Negative for abdominal pain.   Neurological: Negative for syncope, weakness and headaches.   Hematological: Negative for adenopathy. Does not bruise/bleed easily and no excessive bruising.   Psychiatric/Behavioral: Negative for confusion and sleep disturbance. The patient is not nervous/anxious.       Previous Reports Reviewed:   ER records, historical medical records, lab reports, nursing home notes, office notes, operative reports, radiology reports, referral letter/letters and x-ray reports   The following portions of the patient's history were reviewed and updated as appropriate: allergies, current medications, past family history, past medical history, past social history, past surgical history and problem list.    Objective:      /70 (BP Location: Left arm, Patient Position: Sitting)   Pulse 96   Temp 97.2 °F (36.2 °C)   Wt 107 kg (236 lb)   SpO2 97%   BMI 34.85 kg/m²   Body mass index is 34.85 kg/m².     Physical Exam   Constitutional:  He is oriented to person, place, and time. He appears well-developed and well-nourished.  Non-toxic appearance. No distress.   HENT:   Head: Normocephalic and atraumatic.   Right Ear: External ear normal.   Left Ear: External ear normal.   Mouth/Throat: Uvula is midline, oropharynx is clear and moist and mucous membranes are normal. Mallampati Score: II.   Neck: Trachea normal and normal range of motion. Neck supple. No thyromegaly present.   Cardiovascular: Normal rate, regular rhythm, normal heart sounds and intact distal pulses. Exam reveals no gallop and no friction rub.   No murmur heard.  Pulmonary/Chest: Normal expansion, symmetric chest wall expansion and effort normal. He has no wheezes. He has no rhonchi. He has no rales.   Abdominal: Soft. Bowel sounds are normal. He exhibits no distension. There is no abdominal tenderness.   Musculoskeletal: Normal range of motion.         General: No tenderness, deformity or edema.   Lymphadenopathy: No supraclavicular adenopathy is present.     He has no cervical adenopathy.     He has no axillary adenopathy.   Neurological: He is alert and oriented to person, place, and time. He has normal strength. No cranial nerve deficit or sensory deficit. GCS eye subscore is 4. GCS verbal subscore is 5. GCS motor subscore is 6.   Skin: Skin is warm, dry and intact. No rash noted.   Psychiatric: He has a normal mood and affect.   Nursing note and vitals reviewed.     Personal Review of Relevant Diagnostic Studies:  I have personally reviewed and interpreted the following labs/studies/images.   none pertinent    Assessment:       1. ROSA (obstructive sleep apnea)    2. Cigarette nicotine dependence with nicotine-induced disorder    3. Obesity (BMI 30.0-34.9)    4. Hypertension, unspecified type        Impression:  ROSA not on CPAP.  Needs new PSG with CPAP titration.    Plan:     · PSG with CPAP titration.  · Continue chantix and smoking cessation program.  · Will follow up results  of PSG and if needed arrange NIPPV.  · If started on CPAP will follow up after 4 weeks to ensure compliance.    I informed the patient of my working diagnosis, it's etiology, risk factors, expected symptoms, diagnostic work up, treatment options and prognosis., I personally reviewed the results of relevant imaging/labs/studies with the patient, and discussed their clinical significance., Discussed the health related consequences associated with obesity and counseled re: healthy diet/lifestyle modifications., I spent >10 minutes counseling the patient about their condition., Plan discussed with the patient, who is in agreement., Opportunity provided for the the patient to voice any additional questions or concerns., All questions were answered to the patient's satisfaction., Educational material provided and Patient given date for next visit.    Follow up in about 6 weeks (around 8/11/2020).    Orders Placed This Visit:  Orders Placed This Encounter   Procedures    COVID-19 Routine Screening     Perform Prior To Sleep Study     Standing Status:   Future     Standing Expiration Date:   6/30/2021     Order Specific Question:   Is the patient symptomatic?     Answer:   No    COVID-19 Routine Screening     Perform Prior to CPAP Titration     Standing Status:   Future     Standing Expiration Date:   6/30/2021     Order Specific Question:   Is the patient symptomatic?     Answer:   No    Polysomnogram (CPAP will be added if patient meets diagnostic criteria.)     Standing Status:   Future     Standing Expiration Date:   6/30/2021    CPAP Titration (Must have dx of ROSA from previous sleep study.)     Standing Status:   Future     Standing Expiration Date:   6/30/2021       Александр Roberts MD  Pulmonary / Critical Care Medicine  [facility]

## 2020-06-30 NOTE — LETTER
June 30, 2020      Dawit Ayon NP  [address]  [place] [address]           [facility] - Pulmonology  [address]  [address]  [facility] [address]  Phone: [phone]          Patient: Oliver Vargas   MR Number: [id number]   YOB: 1975   Date of Visit: 6/30/2020       Dear Dawit Ayon:    Thank you for referring Oliver Vargas to me for evaluation. Attached you will find relevant portions of my assessment and plan of care.    If you have questions, please do not hesitate to call me. I look forward to following Oliver Vargas along with you.    Sincerely,    Александр Roberts MD    Enclosure  CC:  No Recipients    If you would like to receive this communication electronically, please contact [email or web address] or [phone] to request more information on [employer] Link access.    For providers and/or their staff who would like to refer a patient to [facility], please contact us through our one-stop-shop provider referral line, [facility] , at [phone].    If you feel you have received this communication in error or would no longer like to receive these types of communications, please e-mail [email or web address]

## 2020-06-30 NOTE — PATIENT INSTRUCTIONS
Obstructive Sleep Apnea  Obstructive sleep apnea is a condition that causes your air passages to become narrowed or blocked during sleep. As a result, breathing stops for short periods. Your body wakes up enough for breathing to begin again, though you don't remember it. The cycle of stopped breathing and brief awakenings can repeat dozens of times a night. This prevents the body from getting to the deeper stages of sleep that are needed for good rest and may cause your body's oxygen level to fall.  Signs of sleep apnea include loud snoring, noisy breathing, and gasping sounds during sleep. Daytime symptoms include waking up tired after a full night's sleep, waking up with headaches, feeling very sleepy or falling asleep during the day, and having problems with memory or concentration.  Risk factors for sleep apnea include:  · Being overweight  · Being a man, or a woman in menopause  · Smoking  · Using alcohol or sedating medicines  · Having enlarged structures in the nose or throat  Home care  Lifestyle changes that can help treat snoring and sleep apnea include the following:  · If you are overweight, lose weight. Talk to your healthcare provider about a weight-loss plan for you.  · Avoid alcohol for 3 to 4 hours before bedtime. Avoid sedating medications. Ask your healthcare provider about the medicines you take.  · If you smoke, talk to your healthcare provider about ways to quit.  · Sleep on your side. This can help prevent gravity from pulling relaxed throat tissues into your breathing passages.  · If you have allergies or sinus problems that block your nose, ask your healthcare provider for help.  Follow-up care  Follow up with your healthcare provider, or as advised. A diagnosis of sleep apnea is made with a sleep study. Your healthcare provider can tell you more about this test.  When to seek medical advice  Sleep apnea can make you more likely to have certain health problems. These include high blood  pressure, heart attack, stroke, and sexual dysfunction. If you have sleep apnea, talk to your healthcare provider about the best treatments for you.  Date Last Reviewed: 4/1/2017  © 7465-3741 [employer]. [address] [place] [address], [place] [address]. All rights reserved. This information is not intended as a substitute for professional medical care. Always follow your healthcare professional's instructions.

## 2020-07-01 NOTE — PROGRESS NOTES
[facility] hematology Oncology in office follow-up progress note  June 29, 2020    Patient is following isolation precautions at home because of corona virus epidemic        Subjective:      Patient ID:   Oliver Vargas  44 y.o. male  1975  LINDA Ac Ed, MD    Chief Complaint:   Increased ferritin    HPI:  44 y.o. male sees Ms. Dawit AyonMELVIN for primary care over the last 6 months.  He is retired  over the last 1-1/2 years.    Recent he was found to have polycythemia and increased ferritin level I started him on periodic phlebotomy schedule through the blood center over the last several months.    Other history includes fatty liver.    He has a history of  long-term smoking.  He recently attended the smoking cessation clinic and is on Chantix.  He feels he will be able to stop smoking.    He is due to see the pulmonologist today for possible sleep apnea syndrome or COPD evaluation.    He has diabetes, hypertension, sleep apnea syndrome, GERD symptoms rarely, and has been found to have elevated LFTs.  Recent lab work showed a ferritin in the 865 range.    He has not had previous surgeries.     He told me that he drank beer and was a Lush in the past, he drinks 6-8 beers once or twice per week now.    He denies allergies to medications.  In the service he worked with [employer] for greater than 20 years.    His mother had hypertension, diabetes, and increased iron.  His father had CABG surgery, renal cancer, and hypertension.  His brother had thyroid cancer.  Another brother is alive and well.  A sister has diabetes.  A son is alive and well and a daughter is obese.    He has seen Dr. Zelaya who has recommended him to decrease alcohol intake and decreased weight because of suspected fatty liver..      ROS:   GEN: normal without any fever, night sweats or weight loss  HEENT: normal with no HA's, sore throat, stiff neck, changes in vision  CV: normal with no CP, SOB, PND, FERNANDEZ or  orthopnea  PULM: normal with no SOB, cough, hemoptysis, sputum or pleuritic pain.  See history of present illness  GI: normal with no abdominal pain, nausea, vomiting, constipation, diarrhea, melanotic stools, BRBPR, or hematemesis.  See history of present illness  : normal with no hematuria, dysuria  BREAST: normal with no mass, discharge, pain  SKIN: normal with no rash, erythema, bruising, or swelling     Past Medical History:   Diagnosis Date    Hypertension      No past surgical history on file.    Review of patient's allergies indicates:  No Known Allergies  Social History     Socioeconomic History    Marital status:      Spouse name: Not on file    Number of children: Not on file    Years of education: Not on file    Highest education level: Not on file   Occupational History    Not on file   Social Needs    Financial resource strain: Not on file    Food insecurity     Worry: Not on file     Inability: Not on file    Transportation needs     Medical: Not on file     Non-medical: Not on file   Tobacco Use    Smoking status: Current Every Day Smoker     Packs/day: 0.50     Types: Cigarettes    Smokeless tobacco: Never Used   Substance and Sexual Activity    Alcohol use: Yes    Drug use: Never    Sexual activity: Yes   Lifestyle    Physical activity     Days per week: Not on file     Minutes per session: Not on file    Stress: Not on file   Relationships    Social connections     Talks on phone: Not on file     Gets together: Not on file     Attends Mu-ism service: Not on file     Active member of club or organization: Not on file     Attends meetings of clubs or organizations: Not on file     Relationship status: Not on file   Other Topics Concern    Not on file   Social History Narrative    Not on file         Current Outpatient Medications:     lisinopriL 10 MG tablet, Take 1 tablet (10 mg total) by mouth once daily., Disp: 90 tablet, Rfl: 1    metFORMIN (GLUCOPHAGE-XR) 500 MG  XR 24hr tablet, Take 1 tablet (500 mg total) by mouth daily with breakfast., Disp: 90 tablet, Rfl: 1    varenicline (CHANTIX STARTING MONTH BOX) 0.5 mg (11)- 1 mg (42) tablet, Follow package directions, Disp: 53 tablet, Rfl: 0          Objective:   Vitals:  Blood pressure 131/85, pulse 97, temperature 98.5 °F (36.9 °C), temperature source Oral, resp. rate 16, weight 107.1 kg (236 lb 1.6 oz).    Physical Examination:   GEN: no apparent distress, comfortable  HEAD: atraumatic and normocephalic  EYES: no pallor, no icterus  ENT:  no pharyngeal erythema, external ears WNL; no nasal discharge  NECK: no masses, thyroid normal, trachea midline, no LAD/LN's, supple  CV: RRR with no murmur; normal pulse; normal S1 and S2; no pedal edema  CHEST: Normal respiratory effort; CTAB; normal breath sounds; no wheeze or crackles  ABDOM: nontender and nondistended; soft; normal bowel sounds; no rebound/guarding, L/S NP  MUSC/Skeletal: ROM normal; no crepitus; joints normal; no deformities or arthropathy  EXTREM: no clubbing, cyanosis, inflammation or swelling  SKIN: no rashes, lesions, ulcers, petechiae or subcutaneous nodules  : no palpable testicular mass  NEURO: grossly intact; motor/sensory WNL; no tremors  PSYCH: normal mood, affect and behavior  LYMPH: normal cervical, supraclavicular, axillary and groin LN's  BREASTS:  No palpable mass at left or right chest.    LFT's are slightly increased, total bilirubin level is normal.  His ferritin level is increased at 865.    Hemoglobin is 18 hematocrit is 52, white blood count is 12.7, MCV is 96, platelet count is 243,000, he has a normal differential.  Creatinine is 0.9, hepatitis serology studies are negative.    Labs:   Lab Results   Component Value Date    WBC 12.7 (H) 02/18/2020    HGB 18.0 (H) 02/18/2020    HCT 52.3 (H) 02/18/2020    MCV 96 02/18/2020     02/18/2020    CMP  Sodium   Date Value Ref Range Status   02/18/2020 136 134 - 144 mmol/L Final     Potassium   Date  Value Ref Range Status   02/18/2020 4.4 3.5 - 5.2 mmol/L Final     Chloride   Date Value Ref Range Status   02/18/2020 97 96 - 106 mmol/L Final     CO2   Date Value Ref Range Status   02/18/2020 20 20 - 29 mmol/L Final     Carbon Monoxide, Blood   Date Value Ref Range Status   09/15/2018 5 % Final     Comment:     -------------------REFERENCE VALUE--------------------------  0-2 Normal (Non-smoker) , < or = 9 Normal (Smoker), > or   = 20 (Toxic concentration)  Test Performed by:  [facility]  [address] [place] [address]       Glucose   Date Value Ref Range Status   02/18/2020 184 (H) 65 - 99 mg/dL Final     BUN, Bld   Date Value Ref Range Status   02/18/2020 13 6 - 24 mg/dL Final     Creatinine   Date Value Ref Range Status   02/18/2020 0.91 0.76 - 1.27 mg/dL Final     Calcium   Date Value Ref Range Status   02/18/2020 9.4 8.7 - 10.2 mg/dL Final     Total Protein   Date Value Ref Range Status   03/03/2020 7.1 6.0 - 8.5 g/dL Final     Albumin   Date Value Ref Range Status   03/03/2020 4.1 4.0 - 5.0 g/dL Final     Total Bilirubin   Date Value Ref Range Status   03/03/2020 0.3 0.0 - 1.2 mg/dL Final     Alkaline Phosphatase   Date Value Ref Range Status   03/03/2020 94 39 - 117 IU/L Final     AST   Date Value Ref Range Status   03/03/2020 55 (H) 0 - 40 IU/L Final     ALT   Date Value Ref Range Status   03/03/2020 121 (H) 0 - 44 IU/L Final     Anion Gap   Date Value Ref Range Status   09/15/2018 16 8 - 16 mmol/L Final     eGFR if    Date Value Ref Range Status   09/15/2018 >60 >60 mL/min/1.73 m^2 Final     eGFR if non    Date Value Ref Range Status   02/18/2020 103 >59 mL/min/1.73 Final     He tells me most recent hemoglobin through the blood center was improved at 16.  Ultrasound of the abdomen in May 2020 showed diffuse hepatic steatosis without gallstones and spleen was normal in size.    Assessment:   (1) 44 y.o. male has elevated  LFTs with increased ferritin at 865 and long-term alcohol excess.  He may have fatty liver, chemical hepatitis, or cirrhosis at this point.  He has been encouraged to stop drinking and lose weight per Dr. Zelaya.    (2) the increased ferritin at 865 may be contributing to liver damage.  I have written new orders to take off a pt of whole blood  monthly.  This should gradually deplete  increased ferritin down to the normal range.  By history,  his mother also had increased iron, he believes.  This may be familial hemochromatosis.  Will request  hemochromatosis DNA studies when he follows up here.    (3) he has sleep apnea syndrome and at least a 25 year history of cigarette smoking.  He has expressed interest in trying to stop smoking.  He has gone to the smoking cessation Clinic, he is on Chantix and he believes he will be able to stop smoking.    (4) he has secondary polycythemia from sleep apnea syndrome and perhaps COPD.  The above orders to draw off a pt of whole blood regularly should maintain his hemoglobin at approximately 15, and decrease future risk of CVA, AMI, DVT and other clot events.    He is due to see a pulmonologist today for assessment of sleep apnea syndrome and/ or COPD with recommendations.    Return to clinic here in 4 months with CBC and ferritin    (5) he needs to be evaluated for a CPAP machine, this may help in control of the secondary polycythemia with treatment of his sleep apnea syndrome.  Will try to refer him to a pulmonologist or sleep specialist who takes his  coverage.    He will begin his phlebotomy schedule as above at the blood center and follow up here in 2 months.              I have explained and the patient understands all of  the current recommendation(s). I have answered all of their questions to the best of my ability and to their complete satisfaction.

## 2020-07-05 ENCOUNTER — LAB VISIT (OUTPATIENT)
Dept: PRIMARY CARE CLINIC | Facility: CLINIC | Age: 45
End: 2020-07-05
Payer: OTHER GOVERNMENT

## 2020-07-05 PROCEDURE — U0003 INFECTIOUS AGENT DETECTION BY NUCLEIC ACID (DNA OR RNA); SEVERE ACUTE RESPIRATORY SYNDROME CORONAVIRUS 2 (SARS-COV-2) (CORONAVIRUS DISEASE [COVID-19]), AMPLIFIED PROBE TECHNIQUE, MAKING USE OF HIGH THROUGHPUT TECHNOLOGIES AS DESCRIBED BY CMS-2020-01-R: HCPCS

## 2020-07-06 LAB — SARS-COV-2 RNA RESP QL NAA+PROBE: NOT DETECTED

## 2020-07-08 ENCOUNTER — CLINICAL SUPPORT (OUTPATIENT)
Dept: SMOKING CESSATION | Facility: CLINIC | Age: 45
End: 2020-07-08
Payer: COMMERCIAL

## 2020-07-08 ENCOUNTER — PROCEDURE VISIT (OUTPATIENT)
Dept: SLEEP MEDICINE | Facility: HOSPITAL | Age: 45
End: 2020-07-08
Attending: INTERNAL MEDICINE
Payer: OTHER GOVERNMENT

## 2020-07-08 DIAGNOSIS — G47.33 OSA (OBSTRUCTIVE SLEEP APNEA): ICD-10-CM

## 2020-07-08 DIAGNOSIS — F17.200 NICOTINE DEPENDENCE: Primary | ICD-10-CM

## 2020-07-08 PROCEDURE — 99999 PR PBB SHADOW E&M-EST. PATIENT-LVL I: ICD-10-PCS | Mod: PBBFAC,,,

## 2020-07-08 PROCEDURE — 99404 PR PREVENT COUNSEL,INDIV,60 MIN: ICD-10-PCS | Mod: S$GLB,,,

## 2020-07-08 PROCEDURE — 95810 POLYSOM 6/> YRS 4/> PARAM: CPT

## 2020-07-08 PROCEDURE — 99404 PREV MED CNSL INDIV APPRX 60: CPT | Mod: S$GLB,,,

## 2020-07-08 PROCEDURE — 99999 PR PBB SHADOW E&M-EST. PATIENT-LVL I: CPT | Mod: PBBFAC,,,

## 2020-07-08 NOTE — PROGRESS NOTES
Individual Follow-Up Form    7/8/2020    Quit Date:     Clinical Status of Patient: Outpatient    Length of Service: 60 minutes    Continuing Medication: yes  Chantix    Other Medications:      Target Symptoms: Withdrawal and medication side effects. The following were  rated moderate (3) to severe (4) on TCRS:  · Moderate (3): None   · Severe (4): None     Comments: Patient is making progress with reduction and he is down to about 6-8 cigarettes per day. He state that he is on Day 10 of Chantix starter pack and he has tolerated well thus fra without any negatiove side efefcts. He is much more aware of his tobacco usage and he is using delay strategy to aid his progress. We discussed and reviewed: self awareness, his specific cues/triggers, learned addiction model, strategies of delay, distract, move it and change of rouitne, action/reduction plan, personal reasons for quitting, medications, weekly goals, and quit date. He has set a quit date of 7/13/2020. The patient remains on the prescribed tobacco cessation medication regimen of 1 mg Chantix BID without any negative side effects at this time. The patient will continue to come to the clinic for additional support and encouragement.     Diagnosis: F17.200    Next Visit: 2 weeks

## 2020-07-08 NOTE — Clinical Note
Patient is making progress with reduction and he is down to about 6-8 cigarettes per day. He state that he is on Day 10 of Chantix starter pack and he has tolerated well thus fra without any negatiove side efefcts. He is much more aware of his tobacco usage and he is using delay strategy to aid his progress. We discussed and reviewed: self awareness, his specific cues/triggers, learned addiction model, strategies of delay, distract, move it and change of rouitne, action/reduction plan, personal reasons for quitting, medications, weekly goals, and quit date. He has set a quit date of 7/13/2020. The patient remains on the prescribed tobacco cessation medication regimen of 1 mg Chantix BID without any negative side effects at this time. The patient will continue to come to the clinic for additional support and encouragement.

## 2020-07-13 ENCOUNTER — TELEPHONE (OUTPATIENT)
Dept: PULMONOLOGY | Facility: CLINIC | Age: 45
End: 2020-07-13

## 2020-07-13 DIAGNOSIS — G47.33 OSA (OBSTRUCTIVE SLEEP APNEA): Primary | ICD-10-CM

## 2020-07-13 NOTE — TELEPHONE ENCOUNTER
The patient is PSG shows moderate obstructive sleep apnea.  A CPAP titration has been ordered by Dr. Roberts.

## 2020-07-14 ENCOUNTER — TELEPHONE (OUTPATIENT)
Dept: PULMONOLOGY | Facility: HOSPITAL | Age: 45
End: 2020-07-14

## 2020-07-14 DIAGNOSIS — G47.33 OSA (OBSTRUCTIVE SLEEP APNEA): ICD-10-CM

## 2020-07-14 DIAGNOSIS — J44.9 CHRONIC OBSTRUCTIVE PULMONARY DISEASE, UNSPECIFIED COPD TYPE: Primary | ICD-10-CM

## 2020-07-14 NOTE — TELEPHONE ENCOUNTER
----- Message from Mary Pierre sent at 7/14/2020  9:28 AM CDT -----  Good morning,       Mr. Vargas recently had a sleep study.  I have imported it into Epic  for your viewing.    Thank you for your referral  Mayr

## 2020-07-22 ENCOUNTER — CLINICAL SUPPORT (OUTPATIENT)
Dept: SMOKING CESSATION | Facility: CLINIC | Age: 45
End: 2020-07-22
Payer: COMMERCIAL

## 2020-07-22 DIAGNOSIS — F17.200 NICOTINE DEPENDENCE: Primary | ICD-10-CM

## 2020-07-22 PROCEDURE — 99999 PR PBB SHADOW E&M-EST. PATIENT-LVL I: CPT | Mod: PBBFAC,,,

## 2020-07-22 PROCEDURE — 99999 PR PBB SHADOW E&M-EST. PATIENT-LVL I: ICD-10-PCS | Mod: PBBFAC,,,

## 2020-07-22 PROCEDURE — 99404 PREV MED CNSL INDIV APPRX 60: CPT | Mod: S$GLB,,,

## 2020-07-22 PROCEDURE — 99404 PR PREVENT COUNSEL,INDIV,60 MIN: ICD-10-PCS | Mod: S$GLB,,,

## 2020-07-22 RX ORDER — VARENICLINE TARTRATE 1 MG/1
1 TABLET, FILM COATED ORAL 2 TIMES DAILY
Qty: 56 TABLET | Refills: 0 | Status: SHIPPED | OUTPATIENT
Start: 2020-07-22 | End: 2020-08-20 | Stop reason: SDUPTHER

## 2020-07-22 NOTE — PROGRESS NOTES
Individual Follow-Up Form    7/22/2020    Quit Date:     Clinical Status of Patient: Outpatient    Length of Service: 60 minutes    Continuing Medication: yes  Chantix    Other Medications:      Target Symptoms: Withdrawal and medication side effects. The following were  rated moderate (3) to severe (4) on TCRS:  · Moderate (3): Insomnia - discussed withdrawal, habit & chronic waking  · Severe (4): None    Comments: Patient is doing well overall and he is making good progress with reduction. He is down to about 4-5 cigarettes per day and less on some days. He is using strategies previously discussed last visit. We discussed and reviewed: strategies, cues, and triggers. Introduced the negative impact of tobacco on health, the health advantages of discontinuing the use of tobacco, time line improved health changes after a quit, withdrawal issues to expect from nicotine and habit, and ways to achieve the goal of a quit. The patient remains on the prescribed tobacco cessation medication regimen of 1 mg Chantix BID without any negative side effects at this time. The patient will continue to come to the clinic for additional support and encouragement.     Diagnosis: F17.200    Next Visit: 2 weeks

## 2020-07-22 NOTE — Clinical Note
Patient is doing well overall and he is making good progress with reduction. He is down to about 4-5 cigarettes per day and less on some days. He is using strategies previously discussed last visit. We discussed and reviewed: strategies, cues, and triggers. Introduced the negative impact of tobacco on health, the health advantages of discontinuing the use of tobacco, time line improved health changes after a quit, withdrawal issues to expect from nicotine and habit, and ways to achieve the goal of a quit. The patient remains on the prescribed tobacco cessation medication regimen of 1 mg Chantix BID without any negative side effects at this time. The patient will continue to come to the clinic for additional support and encouragement.

## 2020-07-31 ENCOUNTER — HOSPITAL ENCOUNTER (OUTPATIENT)
Dept: PREADMISSION TESTING | Facility: HOSPITAL | Age: 45
Discharge: HOME | End: 2020-07-31
Attending: INTERNAL MEDICINE
Payer: OTHER GOVERNMENT

## 2020-07-31 PROCEDURE — U0003 INFECTIOUS AGENT DETECTION BY NUCLEIC ACID (DNA OR RNA); SEVERE ACUTE RESPIRATORY SYNDROME CORONAVIRUS 2 (SARS-COV-2) (CORONAVIRUS DISEASE [COVID-19]), AMPLIFIED PROBE TECHNIQUE, MAKING USE OF HIGH THROUGHPUT TECHNOLOGIES AS DESCRIBED BY CMS-2020-01-R: HCPCS

## 2020-08-01 LAB — SARS-COV-2 RNA RESP QL NAA+PROBE: NOT DETECTED

## 2020-08-03 ENCOUNTER — PROCEDURE VISIT (OUTPATIENT)
Dept: SLEEP MEDICINE | Facility: HOSPITAL | Age: 45
End: 2020-08-03
Attending: INTERNAL MEDICINE
Payer: OTHER GOVERNMENT

## 2020-08-03 DIAGNOSIS — G47.33 OSA (OBSTRUCTIVE SLEEP APNEA): ICD-10-CM

## 2020-08-03 PROCEDURE — 95811 POLYSOM 6/>YRS CPAP 4/> PARM: CPT

## 2020-08-05 ENCOUNTER — CLINICAL SUPPORT (OUTPATIENT)
Dept: SMOKING CESSATION | Facility: CLINIC | Age: 45
End: 2020-08-05
Payer: COMMERCIAL

## 2020-08-05 DIAGNOSIS — F17.200 NICOTINE DEPENDENCE: Primary | ICD-10-CM

## 2020-08-05 PROCEDURE — 99404 PR PREVENT COUNSEL,INDIV,60 MIN: ICD-10-PCS | Mod: S$GLB,,,

## 2020-08-05 PROCEDURE — 99404 PREV MED CNSL INDIV APPRX 60: CPT | Mod: S$GLB,,,

## 2020-08-05 PROCEDURE — 99999 PR PBB SHADOW E&M-EST. PATIENT-LVL I: CPT | Mod: PBBFAC,,,

## 2020-08-05 PROCEDURE — 99999 PR PBB SHADOW E&M-EST. PATIENT-LVL I: ICD-10-PCS | Mod: PBBFAC,,,

## 2020-08-05 NOTE — PROGRESS NOTES
Individual Follow-Up Form    8/5/2020    Quit Date: 7/25/2020    Clinical Status of Patient: Outpatient    Length of Service: 60 minutes    Continuing Medication: yes  Chantix    Other Medications:      Target Symptoms: Withdrawal and medication side effects. The following were  rated moderate (3) to severe (4) on TCRS:  · Moderate (3): Desire or Crave, irritable, increased appetite - discussed withdrawal and habit   · Severe (4): None     Comments: Patient reports that he is tobacco free. I commended him on his success. We discussed and reviewed at great length:  (Group 3 session packet) strategies, controlling environment, cues, triggers.  Introduced high risk situations with preparation interventions, withdrawal symptoms and management, caffeine similarities with withdrawal issues of habit and nicotine, Alcohol, Understanding urges, cravings, stress and relaxation. Practiced relaxation breathing technique with visualization. Open discussion with intervention discussion. The patient remains on the prescribed tobacco cessation medication regimen of 1 mg Chantix BID without any negative side effects at this time. The patient will continue to come to the clinic for additional support and encouragement.     Diagnosis: F17.200    Next Visit: 2 weeks

## 2020-08-05 NOTE — Clinical Note
Patient reports that he is tobacco free. I commended him on his success. We discussed and reviewed at great length:  (Group 3 session packet) strategies, controlling environment, cues, triggers.  Introduced high risk situations with preparation interventions, withdrawal symptoms and management, caffeine similarities with withdrawal issues of habit and nicotine, Alcohol, Understanding urges, cravings, stress and relaxation. Practiced relaxation breathing technique with visualization. Open discussion with intervention discussion. The patient remains on the prescribed tobacco cessation medication regimen of 1 mg Chantix BID without any negative side effects at this time. The patient will continue to come to the clinic for additional support and encouragement.

## 2020-08-07 RX ORDER — SODIUM FLUORIDE 5 MG/G
GEL, DENTIFRICE DENTAL
COMMUNITY
Start: 2020-02-04 | End: 2021-02-04

## 2020-08-07 RX ORDER — METHYLPREDNISOLONE 4 MG/1
TABLET ORAL
COMMUNITY
Start: 2020-02-13 | End: 2020-12-16

## 2020-08-11 ENCOUNTER — OFFICE VISIT (OUTPATIENT)
Dept: PULMONOLOGY | Facility: CLINIC | Age: 45
End: 2020-08-11
Payer: OTHER GOVERNMENT

## 2020-08-11 DIAGNOSIS — I10 HYPERTENSION, UNSPECIFIED TYPE: ICD-10-CM

## 2020-08-11 DIAGNOSIS — F17.219 CIGARETTE NICOTINE DEPENDENCE WITH NICOTINE-INDUCED DISORDER: ICD-10-CM

## 2020-08-11 DIAGNOSIS — G47.33 OSA (OBSTRUCTIVE SLEEP APNEA): Primary | ICD-10-CM

## 2020-08-11 PROCEDURE — 99213 OFFICE O/P EST LOW 20 MIN: CPT | Mod: S$GLB,,, | Performed by: INTERNAL MEDICINE

## 2020-08-11 PROCEDURE — 99213 PR OFFICE/OUTPT VISIT, EST, LEVL III, 20-29 MIN: ICD-10-PCS | Mod: S$GLB,,, | Performed by: INTERNAL MEDICINE

## 2020-08-11 NOTE — PROGRESS NOTES
[facility]  Pulmonology  Initial Clinic Visit    Subjective   Reason for Referral:    Oliver Vargas is a 44 y.o. male referred by his PCP for evaluation of ROSA.    Chief Complaint   Patient presents with    Sleep Apnea     FU      6/30/2020:  Sleep Apnea  Diagnosed with ROSA in the past but was never able to obtain a CPAP.  He presents for a sleep evaluation. He complains of snoring, decreased memory, decreased concentration, decreased sexual drive, impotence, excessive daytime sleepiness, falling asleep while reading, eating, during conversation.  Symptoms began 6 years ago, gradually worsening since that time.  He goes to sleep at 9:30 pm weekdays and 10 pm weekends. He awakens 6:30 am weekdays and 7 on weekends. He falls asleep in 45 minutes.  Collar size 26. He denies noisy environment, uncomfortable room temperature, uncomfortable bedding. Previous evaluation and treatment has included PSG with C PAP titration.    STOP-Bang Questionnaire  Results:  Your score is 7 / 8    You have answered Yes to 2 or more of 4 STOP questions + BMI > 35kg/m2  Therefore, you are at High Risk for Obstructive Sleep Apnea (ROSA)     Smoking History:  He began smoking 25 years ago. He currently smokes less than a pack per day.  He has attempted to quit smoking in the past, most recently 6 months ago. Best success quitting using nicotine patch. Barriers to quitting include nothing    8/11/2020:  Doing well since our last visit.  Hasn't had a cigarette in a month.  Had PSG and CPAP titration done last week.     Past Medical History:   Diagnosis Date    Hypertension      History reviewed. No pertinent surgical history.  Family History   Problem Relation Age of Onset    Hypertension Mother     Diabetes type II Father     Heart disease Father       Social History     Tobacco Use    Smoking status: Former Smoker     Packs/day: 0.50     Types: Cigarettes    Smokeless tobacco: Never Used   Substance and Sexual Activity     Alcohol use: Yes    Drug use: Never    Sexual activity: Yes      Allergies:  Patient has no known allergies.     Outpatient Medications as of 8/11/2020   Medication    fluoride, sodium, (DENTAGEL) 1.1 % Gel    methylPREDNISolone (MEDROL DOSEPACK) 4 mg tablet    lisinopriL 10 MG tablet    metFORMIN (GLUCOPHAGE-XR) 500 MG XR 24hr tablet    varenicline (CHANTIX) 1 mg Tab     No current facility-administered medications on file as of 8/11/2020.       Review of Systems   Constitutional: Negative for fever, chills, weight loss, weight gain and night sweats.   HENT: Negative for postnasal drip, voice change and congestion.    Eyes: Negative for redness and itching.   Respiratory: Positive for apnea. Negative for cough, sputum production, choking, wheezing and previous hospitialization due to pulmonary problems.    Cardiovascular: Negative for chest pain and palpitations.   Genitourinary: Negative for difficulty urinating and hematuria.   Endocrine: Negative for polydipsia, polyphagia and polyuria.    Musculoskeletal: Negative for gait problem, joint swelling and myalgias.   Skin: Negative for rash.   Gastrointestinal: Negative for abdominal pain.   Neurological: Negative for syncope, weakness and headaches.   Hematological: Negative for adenopathy. Does not bruise/bleed easily and no excessive bruising.   Psychiatric/Behavioral: Negative for confusion and sleep disturbance. The patient is not nervous/anxious.       Previous Reports Reviewed:   ER records, historical medical records, lab reports, nursing home notes, office notes, operative reports, radiology reports, referral letter/letters and x-ray reports   The following portions of the patient's history were reviewed and updated as appropriate: allergies, current medications, past family history, past medical history, past social history, past surgical history and problem list.    Objective:      BP (!) (P) 136/96   Pulse (P) 88   Temp (P) 97.3 °F (36.3 °C)    Wt (P) 107.5 kg (237 lb)   SpO2 (P) 97%   BMI (P) 35.00 kg/m²   Body mass index is 35 kg/m² (pended).     Physical Exam   Constitutional: He is oriented to person, place, and time. He appears well-developed and well-nourished.  Non-toxic appearance. No distress.   HENT:   Head: Normocephalic and atraumatic.   Right Ear: External ear normal.   Left Ear: External ear normal.   Mouth/Throat: Uvula is midline, oropharynx is clear and moist and mucous membranes are normal. Mallampati Score: II.   Neck: Trachea normal and normal range of motion. Neck supple. No thyromegaly present.   Cardiovascular: Normal rate, regular rhythm, normal heart sounds and intact distal pulses. Exam reveals no gallop and no friction rub.   No murmur heard.  Pulmonary/Chest: Normal expansion, symmetric chest wall expansion and effort normal. He has no wheezes. He has no rhonchi. He has no rales.   Abdominal: Soft. Bowel sounds are normal. He exhibits no distension. There is no abdominal tenderness.   Musculoskeletal: Normal range of motion.         General: No tenderness, deformity or edema.   Lymphadenopathy: No supraclavicular adenopathy is present.     He has no cervical adenopathy.     He has no axillary adenopathy.   Neurological: He is alert and oriented to person, place, and time. He has normal strength. No cranial nerve deficit or sensory deficit. GCS eye subscore is 4. GCS verbal subscore is 5. GCS motor subscore is 6.   Skin: Skin is warm, dry and intact. No rash noted.   Psychiatric: He has a normal mood and affect.   Nursing note and vitals reviewed.     Personal Review of Relevant Diagnostic Studies:  I have personally reviewed and interpreted the following labs/studies/images.   PS2020:  PSG with AHI of 33   CPAP Titration     Assessment:       1. ROSA (obstructive sleep apnea)    2. Cigarette nicotine dependence with nicotine-induced disorder    3. Hypertension, unspecified type        Impression:  ROSA not on CPAP.      Plan:     · Start CPAP at 7 cm H2O  · Continue chantix and smoking cessation program.  · Follow up after 4 weeks to ensure compliance.    I informed the patient of my working diagnosis, it's etiology, risk factors, expected symptoms, diagnostic work up, treatment options and prognosis., I personally reviewed the results of relevant imaging/labs/studies with the patient, and discussed their clinical significance., Discussed the health related consequences associated with obesity and counseled re: healthy diet/lifestyle modifications., I spent >10 minutes counseling the patient about their condition., Plan discussed with the patient, who is in agreement., Opportunity provided for the the patient to voice any additional questions or concerns., All questions were answered to the patient's satisfaction., Educational material provided and Patient given date for next visit.    Follow up in about 5 weeks (around 9/15/2020).    Orders Placed This Visit:  Orders Placed This Encounter   Procedures    CPAP FOR HOME USE     Order Specific Question:   Type:     Answer:   CPAP     Order Specific Question:   CPAP setting (cmH20):     Answer:   7     Order Specific Question:   Length of need (1-99 months):     Answer:   99     Order Specific Question:   Humidification:     Answer:   Heated     Order Specific Question:   Type of mask:     Answer:   FFM     Order Specific Question:   Headgear?     Answer:   Yes     Order Specific Question:   Tubing?     Answer:   Yes     Order Specific Question:   Humidifier chamber?     Answer:   Yes     Order Specific Question:   Chin strap?     Answer:   Yes     Order Specific Question:   Filters?     Answer:   Yes    CPAP/BIPAP SUPPLIES     Order Specific Question:   Type of mask:     Answer:   FFM     Order Specific Question:   Headgear?     Answer:   Yes     Order Specific Question:   Tubing?     Answer:   Yes     Order Specific Question:   Humidifier chamber?     Answer:   Yes     Order  Specific Question:   Chin strap?     Answer:   No     Order Specific Question:   Filters?     Answer:   Yes     Order Specific Question:   Length of need (1-99 months):     Answer:   99     Александр Roberts MD  Pulmonary / Critical Care Medicine  formerly [facility]

## 2020-08-11 NOTE — PATIENT INSTRUCTIONS
Obstructive Sleep Apnea  Obstructive sleep apnea is a condition that causes your air passages to become narrowed or blocked during sleep. As a result, breathing stops for short periods. Your body wakes up enough for breathing to begin again, though you don't remember it. The cycle of stopped breathing and brief awakenings can repeat dozens of times a night. This prevents the body from getting to the deeper stages of sleep that are needed for good rest and may cause your body's oxygen level to fall.  Signs of sleep apnea include loud snoring, noisy breathing, and gasping sounds during sleep. Daytime symptoms include waking up tired after a full night's sleep, waking up with headaches, feeling very sleepy or falling asleep during the day, and having problems with memory or concentration.  Risk factors for sleep apnea include:  · Being overweight  · Being a man, or a woman in menopause  · Smoking  · Using alcohol or sedating medicines  · Having enlarged structures in the nose or throat  Home care  Lifestyle changes that can help treat snoring and sleep apnea include the following:  · If you are overweight, lose weight. Talk to your healthcare provider about a weight-loss plan for you.  · Avoid alcohol for 3 to 4 hours before bedtime. Avoid sedating medications. Ask your healthcare provider about the medicines you take.  · If you smoke, talk to your healthcare provider about ways to quit.  · Sleep on your side. This can help prevent gravity from pulling relaxed throat tissues into your breathing passages.  · If you have allergies or sinus problems that block your nose, ask your healthcare provider for help.  Follow-up care  Follow up with your healthcare provider, or as advised. A diagnosis of sleep apnea is made with a sleep study. Your healthcare provider can tell you more about this test.  When to seek medical advice  Sleep apnea can make you more likely to have certain health problems. These include high blood  pressure, heart attack, stroke, and sexual dysfunction. If you have sleep apnea, talk to your healthcare provider about the best treatments for you.  Date Last Reviewed: 4/1/2017  © 6281-5187 [employer]. [address] [place] [address], [place] [address]. All rights reserved. This information is not intended as a substitute for professional medical care. Always follow your healthcare professional's instructions.

## 2020-08-13 ENCOUNTER — TELEPHONE (OUTPATIENT)
Dept: PULMONOLOGY | Facility: CLINIC | Age: 45
End: 2020-08-13

## 2020-08-13 NOTE — TELEPHONE ENCOUNTER
----- Message from Mary Pierre sent at 8/13/2020  9:18 AM CDT -----  Good morning,        I have imported  Vargas Titration study into Epic  for your viewing.    Thank you for the referral  Mary

## 2020-08-20 ENCOUNTER — CLINICAL SUPPORT (OUTPATIENT)
Dept: SMOKING CESSATION | Facility: CLINIC | Age: 45
End: 2020-08-20
Payer: COMMERCIAL

## 2020-08-20 DIAGNOSIS — F17.200 NICOTINE DEPENDENCE: Primary | ICD-10-CM

## 2020-08-20 PROCEDURE — 99404 PREV MED CNSL INDIV APPRX 60: CPT | Mod: S$GLB,,,

## 2020-08-20 PROCEDURE — 99999 PR PBB SHADOW E&M-EST. PATIENT-LVL I: CPT | Mod: PBBFAC,,,

## 2020-08-20 PROCEDURE — 99999 PR PBB SHADOW E&M-EST. PATIENT-LVL I: ICD-10-PCS | Mod: PBBFAC,,,

## 2020-08-20 PROCEDURE — 99404 PR PREVENT COUNSEL,INDIV,60 MIN: ICD-10-PCS | Mod: S$GLB,,,

## 2020-08-20 RX ORDER — VARENICLINE TARTRATE 1 MG/1
1 TABLET, FILM COATED ORAL 2 TIMES DAILY
Qty: 56 TABLET | Refills: 0 | Status: SHIPPED | OUTPATIENT
Start: 2020-08-20 | End: 2020-09-19

## 2020-08-20 NOTE — Clinical Note
Patient remains tobacco free at this time. He is managing withdrawal symptoms well at this time. Today we discussed and reviewed the information from Group session #4: strategies, habitual behavior, stress, and high risk situations. Introduced stress with addition interventions, SOLVE, relaxation with interventions, nutrition, exercise, weight gain, and the importance of rewarding oneself for accomplishments for becoming tobacco free.  He has his sights set on a saving for a four [place] as his reward. Open discussion of all items with interventions. CO Monitored today, reading = 2 ppm.   The patient remains on the prescribed tobacco cessation medication regimen of 1 mg Chantix BID without any negative side effects at this time. The patient will continue to come to the clinic for additional support and encouragement.

## 2020-09-03 ENCOUNTER — CLINICAL SUPPORT (OUTPATIENT)
Dept: SMOKING CESSATION | Facility: CLINIC | Age: 45
End: 2020-09-03
Payer: COMMERCIAL

## 2020-09-03 DIAGNOSIS — F17.200 NICOTINE DEPENDENCE: Primary | ICD-10-CM

## 2020-09-03 PROCEDURE — 99999 PR PBB SHADOW E&M-EST. PATIENT-LVL I: CPT | Mod: PBBFAC,,,

## 2020-09-03 PROCEDURE — 99407 BEHAV CHNG SMOKING > 10 MIN: CPT | Mod: S$GLB,,,

## 2020-09-03 PROCEDURE — 99407 PR TOBACCO USE CESSATION INTENSIVE >10 MINUTES: ICD-10-PCS | Mod: S$GLB,,,

## 2020-09-03 PROCEDURE — 99999 PR PBB SHADOW E&M-EST. PATIENT-LVL I: ICD-10-PCS | Mod: PBBFAC,,,

## 2020-09-14 ENCOUNTER — TELEPHONE (OUTPATIENT)
Dept: PULMONOLOGY | Facility: CLINIC | Age: 45
End: 2020-09-14

## 2020-09-14 NOTE — TELEPHONE ENCOUNTER
----- Message from Ron Madrid sent at 9/14/2020  2:12 PM CDT -----  Regarding: Question from patient  Contact: Self, [phone]  The patient would like to speak with Dr. Roberts in regards to his pressure.  He can be reached at [phone].  Please assist and thanks in advance.

## 2020-09-14 NOTE — TELEPHONE ENCOUNTER
Patient wants to see if we can change his presure on his cpap. He is currently on 7 wants to try about 10 . He is still getting episodes every 3 to 4 hours where he will quite breathing an he is not getting enough oxygen can't breath through the mask enough at night .

## 2020-10-06 ENCOUNTER — OFFICE VISIT (OUTPATIENT)
Dept: PULMONOLOGY | Facility: CLINIC | Age: 45
End: 2020-10-06
Payer: OTHER GOVERNMENT

## 2020-10-06 VITALS
DIASTOLIC BLOOD PRESSURE: 80 MMHG | HEART RATE: 96 BPM | OXYGEN SATURATION: 97 % | BODY MASS INDEX: 33.46 KG/M2 | SYSTOLIC BLOOD PRESSURE: 115 MMHG | WEIGHT: 226.63 LBS

## 2020-10-06 DIAGNOSIS — G47.33 OSA (OBSTRUCTIVE SLEEP APNEA): Primary | ICD-10-CM

## 2020-10-06 DIAGNOSIS — I10 HYPERTENSION, UNSPECIFIED TYPE: ICD-10-CM

## 2020-10-06 DIAGNOSIS — Z87.891 FORMER SMOKER: ICD-10-CM

## 2020-10-06 DIAGNOSIS — E66.9 OBESITY (BMI 30.0-34.9): ICD-10-CM

## 2020-10-06 PROCEDURE — 99214 PR OFFICE/OUTPT VISIT, EST, LEVL IV, 30-39 MIN: ICD-10-PCS | Mod: S$GLB,,, | Performed by: INTERNAL MEDICINE

## 2020-10-06 PROCEDURE — 99214 OFFICE O/P EST MOD 30 MIN: CPT | Mod: S$GLB,,, | Performed by: INTERNAL MEDICINE

## 2020-10-06 NOTE — PATIENT INSTRUCTIONS
Obstructive Sleep Apnea  Obstructive sleep apnea is a condition that causes your air passages to become narrowed or blocked during sleep. As a result, breathing stops for short periods. Your body wakes up enough for breathing to begin again, though you don't remember it. The cycle of stopped breathing and brief awakenings can repeat dozens of times a night. This prevents the body from getting to the deeper stages of sleep that are needed for good rest and may cause your body's oxygen level to fall.  Signs of sleep apnea include loud snoring, noisy breathing, and gasping sounds during sleep. Daytime symptoms include waking up tired after a full night's sleep, waking up with headaches, feeling very sleepy or falling asleep during the day, and having problems with memory or concentration.  Risk factors for sleep apnea include:  · Being overweight  · Being a man, or a woman in menopause  · Smoking  · Using alcohol or sedating medicines  · Having enlarged structures in the nose or throat  Home care  Lifestyle changes that can help treat snoring and sleep apnea include the following:  · If you are overweight, lose weight. Talk to your healthcare provider about a weight-loss plan for you.  · Avoid alcohol for 3 to 4 hours before bedtime. Avoid sedating medications. Ask your healthcare provider about the medicines you take.  · If you smoke, talk to your healthcare provider about ways to quit.  · Sleep on your side. This can help prevent gravity from pulling relaxed throat tissues into your breathing passages.  · If you have allergies or sinus problems that block your nose, ask your healthcare provider for help.  Follow-up care  Follow up with your healthcare provider, or as advised. A diagnosis of sleep apnea is made with a sleep study. Your healthcare provider can tell you more about this test.  When to seek medical advice  Sleep apnea can make you more likely to have certain health problems. These include high blood  pressure, heart attack, stroke, and sexual dysfunction. If you have sleep apnea, talk to your healthcare provider about the best treatments for you.  Date Last Reviewed: 4/1/2017  © 8210-5725 [employer]. [address] [place] [address], [place] [address]. All rights reserved. This information is not intended as a substitute for professional medical care. Always follow your healthcare professional's instructions.

## 2020-10-06 NOTE — PROGRESS NOTES
[facility]  Pulmonology  Follow-Up Clinic Visit    Subjective   Reason for Referral:    Oliver Vargas is a 44 y.o. male referred by his PCP for evaluation of ROSA.    Chief Complaint   Patient presents with    Apnea     follow up       6/30/2020:  Sleep Apnea  Diagnosed with ROSA in the past but was never able to obtain a CPAP.  He presents for a sleep evaluation. He complains of snoring, decreased memory, decreased concentration, decreased sexual drive, impotence, excessive daytime sleepiness, falling asleep while reading, eating, during conversation.  Symptoms began 6 years ago, gradually worsening since that time.  He goes to sleep at 9:30 pm weekdays and 10 pm weekends. He awakens 6:30 am weekdays and 7 on weekends. He falls asleep in 45 minutes.  Collar size 26. He denies noisy environment, uncomfortable room temperature, uncomfortable bedding. Previous evaluation and treatment has included PSG with C PAP titration.    STOP-Bang Questionnaire  Results:  Your score is 7 / 8    You have answered Yes to 2 or more of 4 STOP questions + BMI > 35kg/m2  Therefore, you are at High Risk for Obstructive Sleep Apnea (ROSA)     Smoking History:  He began smoking 25 years ago. He currently smokes less than a pack per day.  He has attempted to quit smoking in the past, most recently 6 months ago. Best success quitting using nicotine patch. Barriers to quitting include nothing    8/11/2020:  Doing well since our last visit.  Hasn't had a cigarette in a month.  Had PSG and CPAP titration done last week.    10/6/2020:  Got CPAP in early September.  Has quit smoking, drink less frequently and has lost 15 lbs.     Past Medical History:   Diagnosis Date    Hypertension      History reviewed. No pertinent surgical history.  Family History   Problem Relation Age of Onset    Hypertension Mother     Diabetes type II Father     Heart disease Father       Social History     Tobacco Use    Smoking status: Former Smoker      Packs/day: 0.50     Types: Cigarettes    Smokeless tobacco: Never Used   Substance and Sexual Activity    Alcohol use: Yes    Drug use: Never    Sexual activity: Yes      Allergies:  Patient has no known allergies.     Outpatient Medications as of 10/6/2020   Medication    fluoride, sodium, (DENTAGEL) 1.1 % Gel    lisinopriL 10 MG tablet    metFORMIN (GLUCOPHAGE-XR) 500 MG XR 24hr tablet    methylPREDNISolone (MEDROL DOSEPACK) 4 mg tablet     No current facility-administered medications on file as of 10/6/2020.       Review of Systems   Constitutional: Negative for fever, chills, weight loss, weight gain and night sweats.   HENT: Negative for postnasal drip, voice change and congestion.    Eyes: Negative for redness and itching.   Respiratory: Positive for apnea. Negative for cough, sputum production, choking, wheezing and previous hospitialization due to pulmonary problems.    Cardiovascular: Negative for chest pain and palpitations.   Genitourinary: Negative for difficulty urinating and hematuria.   Endocrine: Negative for polydipsia, polyphagia and polyuria.    Musculoskeletal: Negative for gait problem, joint swelling and myalgias.   Skin: Negative for rash.   Gastrointestinal: Negative for abdominal pain.   Neurological: Negative for syncope, weakness and headaches.   Hematological: Negative for adenopathy. Does not bruise/bleed easily and no excessive bruising.   Psychiatric/Behavioral: Negative for confusion and sleep disturbance. The patient is not nervous/anxious.       Previous Reports Reviewed:   ER records, historical medical records, lab reports, nursing home notes, office notes, operative reports, radiology reports, referral letter/letters and x-ray reports   The following portions of the patient's history were reviewed and updated as appropriate: allergies, current medications, past family history, past medical history, past social history, past surgical history and problem  list.    Objective:      /80 (BP Location: Left arm, Patient Position: Sitting)   Pulse 96   Wt 102.8 kg (226 lb 9.6 oz)   SpO2 97%   BMI 33.46 kg/m²   Body mass index is 33.46 kg/m².     Physical Exam   Constitutional: He is oriented to person, place, and time. He appears well-developed and well-nourished.  Non-toxic appearance. No distress.   HENT:   Head: Normocephalic and atraumatic.   Right Ear: External ear normal.   Left Ear: External ear normal.   Mouth/Throat: Uvula is midline, oropharynx is clear and moist and mucous membranes are normal. Mallampati Score: II.   Neck: Trachea normal and normal range of motion. Neck supple. No thyromegaly present.   Cardiovascular: Normal rate, regular rhythm, normal heart sounds and intact distal pulses. Exam reveals no gallop and no friction rub.   No murmur heard.  Pulmonary/Chest: Normal expansion, symmetric chest wall expansion and effort normal. He has no wheezes. He has no rhonchi. He has no rales.   Abdominal: Soft. Bowel sounds are normal. He exhibits no distension. There is no abdominal tenderness.   Musculoskeletal: Normal range of motion.         General: No tenderness, deformity or edema.   Lymphadenopathy: No supraclavicular adenopathy is present.     He has no cervical adenopathy.     He has no axillary adenopathy.   Neurological: He is alert and oriented to person, place, and time. He has normal strength. No cranial nerve deficit or sensory deficit. GCS eye subscore is 4. GCS verbal subscore is 5. GCS motor subscore is 6.   Skin: Skin is warm, dry and intact. No rash noted.   Psychiatric: He has a normal mood and affect.   Nursing note and vitals reviewed.     Personal Review of Relevant Diagnostic Studies:  I have personally reviewed and interpreted the following labs/studies/images.   PS2020:  PSG with AHI of 33   CPAP Titration    CPAP Recording:  o Days:   (only 4 nights without since getting it)  o Avg hours / night:  >  5  o Avg AHI:  < 4    Assessment:       1. ROSA (obstructive sleep apnea)    2. Hypertension, unspecified type    3. Obesity (BMI 30.0-34.9)    4. Former smoker        Impression:  ROSA recently started on CPAP.   Has stopped smoking.    Plan:     · Continue CPAP at 12 cm H2O.  · Encouraged continued smoking cessation.  · Continue chantix for now.  · Continue to work on losing weight.    I informed the patient of my working diagnosis, it's etiology, risk factors, expected symptoms, diagnostic work up, treatment options and prognosis., I personally reviewed the results of relevant imaging/labs/studies with the patient, and discussed their clinical significance., Discussed the health related consequences associated with obesity and counseled re: healthy diet/lifestyle modifications., I spent >10 minutes counseling the patient about their condition., Plan discussed with the patient, who is in agreement., Opportunity provided for the the patient to voice any additional questions or concerns., All questions were answered to the patient's satisfaction., Educational material provided and Patient given date for next visit.    Follow up in about 1 year (around 10/6/2021).    Orders Placed This Visit:  No orders of the defined types were placed in this encounter.    Александр Roberts MD  Pulmonary / Critical Care Medicine  [facility]

## 2020-10-19 ENCOUNTER — CLINICAL SUPPORT (OUTPATIENT)
Dept: SMOKING CESSATION | Facility: CLINIC | Age: 45
End: 2020-10-19
Payer: COMMERCIAL

## 2020-10-19 DIAGNOSIS — F17.200 NICOTINE DEPENDENCE: Primary | ICD-10-CM

## 2020-10-19 PROCEDURE — 99407 BEHAV CHNG SMOKING > 10 MIN: CPT | Mod: S$GLB,,,

## 2020-10-19 PROCEDURE — 99999 PR PBB SHADOW E&M-EST. PATIENT-LVL I: ICD-10-PCS | Mod: PBBFAC,,,

## 2020-10-19 PROCEDURE — 99407 PR TOBACCO USE CESSATION INTENSIVE >10 MINUTES: ICD-10-PCS | Mod: S$GLB,,,

## 2020-10-19 PROCEDURE — 99999 PR PBB SHADOW E&M-EST. PATIENT-LVL I: CPT | Mod: PBBFAC,,,

## 2020-10-27 ENCOUNTER — OFFICE VISIT (OUTPATIENT)
Dept: HEMATOLOGY/ONCOLOGY | Facility: CLINIC | Age: 45
End: 2020-10-27
Payer: OTHER GOVERNMENT

## 2020-10-27 VITALS
DIASTOLIC BLOOD PRESSURE: 90 MMHG | RESPIRATION RATE: 18 BRPM | TEMPERATURE: 98 F | WEIGHT: 217.63 LBS | HEART RATE: 101 BPM | BODY MASS INDEX: 32.13 KG/M2 | SYSTOLIC BLOOD PRESSURE: 139 MMHG

## 2020-10-27 DIAGNOSIS — R79.89 ELEVATED FERRITIN: Primary | ICD-10-CM

## 2020-10-27 DIAGNOSIS — D75.1 POLYCYTHEMIA, SECONDARY: ICD-10-CM

## 2020-10-27 PROCEDURE — 99214 OFFICE O/P EST MOD 30 MIN: CPT | Mod: S$GLB,,, | Performed by: INTERNAL MEDICINE

## 2020-10-27 PROCEDURE — 99214 PR OFFICE/OUTPT VISIT, EST, LEVL IV, 30-39 MIN: ICD-10-PCS | Mod: S$GLB,,, | Performed by: INTERNAL MEDICINE

## 2020-11-01 NOTE — PROGRESS NOTES
[facility] hematology Oncology in office follow-up progress note  0ctober 27, 2020        Subjective:      Patient ID:   Oliver Vargas  45 y.o. male  1975  LINDA Ac Ed, MD    Chief Complaint:   Increased ferritin    HPI:  45 y.o. male sees Ms. Dawit AyonMELVIN for primary care over the last 12 months.  He is retired  over the last 1-1/2 years.    Recent he was found to have polycythemia and increased ferritin level.   I started him on periodic phlebotomy schedule through the blood center over the last year.      Other history includes fatty liver.    He has a history of  long-term smoking.  He recently attended the smoking cessation clinic and is on Chantix.  He feels he will be able to stop smoking.  He reports that he has not smoked in 3 months.    He is due to see the pulmonologist today for possible sleep apnea syndrome or COPD evaluation.  Dr. Roberts of Pulmonary reports that he has sleep apnea syndrome.  He is using a CPAP machine now.    He has diabetes, hypertension, sleep apnea syndrome, GERD symptoms rarely, and has been found to have elevated LFTs.  Recent lab work showed a ferritin in the 865 range.    He has not had previous surgeries.     He told me that he drank beer and was a Lush in the past, he drinks 6-8 beers once or twice per week now.    He denies allergies to medications.  In the service he worked with [employer] for greater than 20 years.    His mother had hypertension, diabetes, and increased iron.  His father had CABG surgery, renal cancer, and hypertension.  His brother had thyroid cancer.  Another brother is alive and well.  A sister has diabetes.  A son is alive and well and a daughter is obese.    He has seen Dr. Zelaya who has recommended him to decrease alcohol intake and decreased weight because of suspected fatty liver..      ROS:   GEN: normal without any fever, night sweats or weight loss  HEENT: normal with no HA's, sore throat, stiff neck,  changes in vision  CV: normal with no CP, SOB, PND, FERNANDEZ or orthopnea  PULM: normal with no SOB, cough, hemoptysis, sputum or pleuritic pain.  See history of present illness  GI: normal with no abdominal pain, nausea, vomiting, constipation, diarrhea, melanotic stools, BRBPR, or hematemesis.  See history of present illness  : normal with no hematuria, dysuria  BREAST: normal with no mass, discharge, pain  SKIN: normal with no rash, erythema, bruising, or swelling     Past Medical History:   Diagnosis Date    Hypertension      History reviewed. No pertinent surgical history.        Current Outpatient Medications:     fluoride, sodium, (DENTAGEL) 1.1 % Gel, APPLY 1.1% SODIUM FLUORIDE ORAL CREAM MOUTH TWICE A DAY, Disp: , Rfl:     lisinopriL 10 MG tablet, Take 1 tablet (10 mg total) by mouth once daily., Disp: 90 tablet, Rfl: 1    metFORMIN (GLUCOPHAGE-XR) 500 MG XR 24hr tablet, Take 1 tablet (500 mg total) by mouth daily with breakfast., Disp: 90 tablet, Rfl: 1    methylPREDNISolone (MEDROL DOSEPACK) 4 mg tablet, , Disp: , Rfl:           Objective:   Vitals:  Blood pressure (!) 139/90, pulse 101, temperature 98 °F (36.7 °C), resp. rate 18, weight 98.7 kg (217 lb 9.6 oz).    Physical Examination:   GEN: no apparent distress, comfortable  HEAD: atraumatic and normocephalic  EYES: no pallor, no icterus  ENT:  no pharyngeal erythema, external ears WNL; no nasal discharge  NECK: no masses, thyroid normal, trachea midline, no LAD/LN's, supple  CV: RRR with no murmur; normal pulse; normal S1 and S2; no pedal edema  CHEST: Normal respiratory effort; CTAB; normal breath sounds; no wheeze or crackles  ABDOM: nontender and nondistended; soft; normal bowel sounds; no rebound/guarding, L/S NP  MUSC/Skeletal: ROM normal; no crepitus; joints normal; no deformities or arthropathy  EXTREM: no clubbing, cyanosis, inflammation or swelling  SKIN: no rashes, lesions, ulcers, petechiae or subcutaneous nodules  : no palpable  testicular mass  NEURO: grossly intact; motor/sensory WNL; no tremors  PSYCH: normal mood, affect and behavior  LYMPH: normal cervical, supraclavicular, axillary and groin LN's  BREASTS:  No palpable mass at left or right chest.    LFT's are slightly increased, total bilirubin level is normal.  His ferritin level is increased at 865.    Hemoglobin is 18 hematocrit is 52, white blood count is 12.7, MCV is 96, platelet count is 243,000, he has a normal differential.  Creatinine is 0.9, hepatitis serology studies are negative.    He will check his CBC and ferritin now to see where he stands.    Labs:   Lab Results   Component Value Date    WBC 12.7 (H) 02/18/2020    HGB 18.0 (H) 02/18/2020    HCT 52.3 (H) 02/18/2020    MCV 96 02/18/2020     02/18/2020    CMP  Sodium   Date Value Ref Range Status   02/18/2020 136 134 - 144 mmol/L Final     Potassium   Date Value Ref Range Status   02/18/2020 4.4 3.5 - 5.2 mmol/L Final     Chloride   Date Value Ref Range Status   02/18/2020 97 96 - 106 mmol/L Final     CO2   Date Value Ref Range Status   02/18/2020 20 20 - 29 mmol/L Final     Carbon Monoxide, Blood   Date Value Ref Range Status   09/15/2018 5 % Final     Comment:     -------------------REFERENCE VALUE--------------------------  0-2 Normal (Non-smoker) , < or = 9 Normal (Smoker), > or   = 20 (Toxic concentration)  Test Performed by:  [facility]  [address] [place] [address]       Glucose   Date Value Ref Range Status   02/18/2020 184 (H) 65 - 99 mg/dL Final     BUN   Date Value Ref Range Status   02/18/2020 13 6 - 24 mg/dL Final     Creatinine   Date Value Ref Range Status   02/18/2020 0.91 0.76 - 1.27 mg/dL Final     Calcium   Date Value Ref Range Status   02/18/2020 9.4 8.7 - 10.2 mg/dL Final     Total Protein   Date Value Ref Range Status   03/03/2020 7.1 6.0 - 8.5 g/dL Final     Albumin   Date Value Ref Range Status   03/03/2020 4.1 4.0 - 5.0 g/dL Final      Total Bilirubin   Date Value Ref Range Status   03/03/2020 0.3 0.0 - 1.2 mg/dL Final     Alkaline Phosphatase   Date Value Ref Range Status   03/03/2020 94 39 - 117 IU/L Final     AST   Date Value Ref Range Status   03/03/2020 55 (H) 0 - 40 IU/L Final     ALT   Date Value Ref Range Status   03/03/2020 121 (H) 0 - 44 IU/L Final     Anion Gap   Date Value Ref Range Status   09/15/2018 16 8 - 16 mmol/L Final     eGFR if    Date Value Ref Range Status   09/15/2018 >60 >60 mL/min/1.73 m^2 Final     eGFR if non    Date Value Ref Range Status   02/18/2020 103 >59 mL/min/1.73 Final     He tells me most recent hemoglobin through the blood center was improved at 16.  Ultrasound of the abdomen in May 2020 showed diffuse hepatic steatosis without gallstones and spleen was normal in size.    Assessment:   (1) 45 y.o. male has elevated LFTs with increased ferritin at 865 and long-term alcohol excess.  He may have fatty liver, chemical hepatitis, or cirrhosis at this point.  He has been encouraged to stop drinking and lose weight per Dr. Zelaya.    (2) the increased ferritin at 865 may be contributing to liver damage.  I have written new orders to take off a pt of whole blood  monthly.  This should gradually deplete  increased ferritin down to the normal range.  By history,  his mother also had increased iron, he believes.  This may be familial hemochromatosis.  Will request  hemochromatosis DNA studies when he follows up here.    (3) he has sleep apnea syndrome and at least a 25 year history of cigarette smoking.  He has expressed interest in trying to stop smoking.  He has gone to the smoking cessation Clinic, he is on Chantix and he believes he will be able to stop smoking.  He has not smoked in 3 months.    (4) he has secondary polycythemia from sleep apnea syndrome and perhaps COPD.  The above orders to draw off a pt of whole blood regularly should maintain his hemoglobin at  approximately 15, and decrease future risk of CVA, AMI, DVT and other clot events.    He has seen Dr. Roberts of Pulmonary and has been diagnosed with sleep apnea syndrome.  He is using a CPAP machine nightly.    He will continue on his phlebotomy schedule and follow up here with a CBC and ferritin in 6 months.    He is due to see a pulmonologist today for assessment of sleep apnea syndrome and/ or COPD with recommendations.    Return to clinic here in 4 months with CBC and ferritin    (5) he needs to be evaluated for a CPAP machine, this may help in control of the secondary polycythemia with treatment of his sleep apnea syndrome.  Will try to refer him to a pulmonologist or sleep specialist who takes his  coverage.    He will begin his phlebotomy schedule as above at the blood center and follow up here in 2 months.              I have explained and the patient understands all of  the current recommendation(s). I have answered all of their questions to the best of my ability and to their complete satisfaction.

## 2020-12-02 ENCOUNTER — TELEPHONE (OUTPATIENT)
Dept: FAMILY MEDICINE | Facility: CLINIC | Age: 45
End: 2020-12-02

## 2020-12-02 DIAGNOSIS — Z79.4 TYPE 2 DIABETES MELLITUS WITHOUT COMPLICATION, WITH LONG-TERM CURRENT USE OF INSULIN: ICD-10-CM

## 2020-12-02 DIAGNOSIS — I10 ESSENTIAL HYPERTENSION: Primary | ICD-10-CM

## 2020-12-02 DIAGNOSIS — E78.5 HYPERLIPIDEMIA, UNSPECIFIED HYPERLIPIDEMIA TYPE: ICD-10-CM

## 2020-12-02 DIAGNOSIS — E11.9 TYPE 2 DIABETES MELLITUS WITHOUT COMPLICATION, WITH LONG-TERM CURRENT USE OF INSULIN: ICD-10-CM

## 2020-12-02 NOTE — TELEPHONE ENCOUNTER
----- Message from Lakisha Sanchez MA sent at 12/2/2020  2:23 PM CST -----  Regarding: FW: lab  orders    ----- Message -----  From: Natacha Barragan  Sent: 12/2/2020   2:14 PM CST  To: Nany Pastor Staff  Subject: lab  orders                                      [facility] next [facility] 12[facility]14/2020 [facility] [place]

## 2020-12-05 LAB
ALBUMIN SERPL-MCNC: 4.8 G/DL (ref 4–5)
ALBUMIN/GLOB SERPL: 1.8 {RATIO} (ref 1.2–2.2)
ALP SERPL-CCNC: 131 IU/L (ref 39–117)
ALT SERPL-CCNC: 144 IU/L (ref 0–44)
AST SERPL-CCNC: 74 IU/L (ref 0–40)
BASOPHILS # BLD AUTO: 0.1 X10E3/UL (ref 0–0.2)
BASOPHILS NFR BLD AUTO: 1 %
BILIRUB SERPL-MCNC: 0.8 MG/DL (ref 0–1.2)
BUN SERPL-MCNC: 6 MG/DL (ref 6–24)
BUN/CREAT SERPL: 8 (ref 9–20)
CALCIUM SERPL-MCNC: 9.4 MG/DL (ref 8.7–10.2)
CHLORIDE SERPL-SCNC: 99 MMOL/L (ref 96–106)
CHOLEST SERPL-MCNC: 251 MG/DL (ref 100–199)
CO2 SERPL-SCNC: 22 MMOL/L (ref 20–29)
CREAT SERPL-MCNC: 0.74 MG/DL (ref 0.76–1.27)
EOSINOPHIL # BLD AUTO: 0.1 X10E3/UL (ref 0–0.4)
EOSINOPHIL NFR BLD AUTO: 2 %
ERYTHROCYTE [DISTWIDTH] IN BLOOD BY AUTOMATED COUNT: 12.7 % (ref 11.6–15.4)
GLOBULIN SER CALC-MCNC: 2.6 G/DL (ref 1.5–4.5)
GLUCOSE SERPL-MCNC: 346 MG/DL (ref 65–99)
HBA1C MFR BLD: 13.2 % (ref 4.8–5.6)
HCT VFR BLD AUTO: 44 % (ref 37.5–51)
HDLC SERPL-MCNC: 28 MG/DL
HGB BLD-MCNC: 14.9 G/DL (ref 13–17.7)
IMM GRANULOCYTES # BLD AUTO: 0 X10E3/UL (ref 0–0.1)
IMM GRANULOCYTES NFR BLD AUTO: 0 %
LDLC SERPL CALC-MCNC: 121 MG/DL (ref 0–99)
LYMPHOCYTES # BLD AUTO: 2.6 X10E3/UL (ref 0.7–3.1)
LYMPHOCYTES NFR BLD AUTO: 39 %
MCH RBC QN AUTO: 31.4 PG (ref 26.6–33)
MCHC RBC AUTO-ENTMCNC: 33.9 G/DL (ref 31.5–35.7)
MCV RBC AUTO: 93 FL (ref 79–97)
MONOCYTES # BLD AUTO: 0.5 X10E3/UL (ref 0.1–0.9)
MONOCYTES NFR BLD AUTO: 7 %
NEUTROPHILS # BLD AUTO: 3.5 X10E3/UL (ref 1.4–7)
NEUTROPHILS NFR BLD AUTO: 51 %
PLATELET # BLD AUTO: 230 X10E3/UL (ref 150–450)
POTASSIUM SERPL-SCNC: 3.9 MMOL/L (ref 3.5–5.2)
PROT SERPL-MCNC: 7.4 G/DL (ref 6–8.5)
RBC # BLD AUTO: 4.75 X10E6/UL (ref 4.14–5.8)
SODIUM SERPL-SCNC: 137 MMOL/L (ref 134–144)
TRIGL SERPL-MCNC: 569 MG/DL (ref 0–149)
VLDLC SERPL CALC-MCNC: 102 MG/DL (ref 5–40)
WBC # BLD AUTO: 6.8 X10E3/UL (ref 3.4–10.8)

## 2020-12-07 DIAGNOSIS — E11.9 TYPE 2 DIABETES MELLITUS WITHOUT COMPLICATION, WITHOUT LONG-TERM CURRENT USE OF INSULIN: ICD-10-CM

## 2020-12-07 NOTE — TELEPHONE ENCOUNTER
----- Message from Dawit Ayon NP sent at 12/7/2020  7:44 AM CST -----  His A1c is very high. He needs a sooner ov to discuss. Natacha is calling pt for appt

## 2020-12-08 RX ORDER — METFORMIN HYDROCHLORIDE 500 MG/1
1000 TABLET, EXTENDED RELEASE ORAL 2 TIMES DAILY WITH MEALS
Qty: 120 TABLET | Refills: 0 | Status: SHIPPED | OUTPATIENT
Start: 2020-12-08 | End: 2020-12-18 | Stop reason: SDUPTHER

## 2020-12-16 ENCOUNTER — OFFICE VISIT (OUTPATIENT)
Dept: FAMILY MEDICINE | Facility: CLINIC | Age: 45
End: 2020-12-16
Payer: OTHER GOVERNMENT

## 2020-12-16 VITALS
HEART RATE: 86 BPM | WEIGHT: 209 LBS | OXYGEN SATURATION: 98 % | TEMPERATURE: 98 F | DIASTOLIC BLOOD PRESSURE: 84 MMHG | SYSTOLIC BLOOD PRESSURE: 120 MMHG | HEIGHT: 69 IN | BODY MASS INDEX: 30.96 KG/M2

## 2020-12-16 DIAGNOSIS — Z23 NEED FOR INFLUENZA VACCINATION: ICD-10-CM

## 2020-12-16 DIAGNOSIS — I10 ESSENTIAL HYPERTENSION: Primary | ICD-10-CM

## 2020-12-16 DIAGNOSIS — E11.9 TYPE 2 DIABETES MELLITUS WITHOUT COMPLICATION, WITHOUT LONG-TERM CURRENT USE OF INSULIN: ICD-10-CM

## 2020-12-16 DIAGNOSIS — K76.0 FATTY LIVER: ICD-10-CM

## 2020-12-16 DIAGNOSIS — R74.8 ELEVATED LIVER ENZYMES: ICD-10-CM

## 2020-12-16 PROCEDURE — 90471 FLU VACCINE (QUAD) GREATER THAN OR EQUAL TO 3YO PRESERVATIVE FREE IM: ICD-10-PCS | Mod: S$GLB,,, | Performed by: NURSE PRACTITIONER

## 2020-12-16 PROCEDURE — 99214 OFFICE O/P EST MOD 30 MIN: CPT | Mod: 25,S$GLB,, | Performed by: NURSE PRACTITIONER

## 2020-12-16 PROCEDURE — 99214 PR OFFICE/OUTPT VISIT, EST, LEVL IV, 30-39 MIN: ICD-10-PCS | Mod: 25,S$GLB,, | Performed by: NURSE PRACTITIONER

## 2020-12-16 PROCEDURE — 90471 IMMUNIZATION ADMIN: CPT | Mod: S$GLB,,, | Performed by: NURSE PRACTITIONER

## 2020-12-16 PROCEDURE — 90686 FLU VACCINE (QUAD) GREATER THAN OR EQUAL TO 3YO PRESERVATIVE FREE IM: ICD-10-PCS | Mod: S$GLB,,, | Performed by: NURSE PRACTITIONER

## 2020-12-16 PROCEDURE — 90686 IIV4 VACC NO PRSV 0.5 ML IM: CPT | Mod: S$GLB,,, | Performed by: NURSE PRACTITIONER

## 2020-12-16 RX ORDER — LANCETS
EACH MISCELLANEOUS
Qty: 100 EACH | Refills: 0 | Status: SHIPPED | OUTPATIENT
Start: 2020-12-16 | End: 2020-12-18 | Stop reason: SDUPTHER

## 2020-12-16 RX ORDER — INSULIN PUMP SYRINGE, 3 ML
EACH MISCELLANEOUS
Qty: 1 EACH | Refills: 0 | Status: SHIPPED | OUTPATIENT
Start: 2020-12-16 | End: 2021-12-16

## 2020-12-16 RX ORDER — (INSULIN DEGLUDEC AND LIRAGLUTIDE) 100; 3.6 [IU]/ML; MG/ML
10 INJECTION, SOLUTION SUBCUTANEOUS DAILY
Qty: 3 ML | Refills: 1 | Status: SHIPPED | OUTPATIENT
Start: 2020-12-16 | End: 2020-12-22 | Stop reason: SDUPTHER

## 2020-12-16 NOTE — PATIENT INSTRUCTIONS
Understanding Carbohydrates, Fats, and Protein  Food is a source of fuel and nourishment for your body. Its also a source of pleasure. Having diabetes doesnt mean you have to eat special foods or give up desserts. Instead, your dietitian can show you how to plan meals to suit your body. To start, learn how different foods affect blood sugar.  Carbohydrates  Carbohydrates are the main source of fuel for the body. Carbohydrates raise blood sugar. Many people think carbohydrates are only found in pasta or bread. But carbohydrates are actually in many kinds of foods:  · Sugars occur naturally in foods such as fruit, milk, honey, and molasses. Sugars can also be added to many foods, from cereals and yogurt to candy and desserts. Sugars raise blood sugar.  · Starches are found in bread, cereals, pasta, and dried beans. Theyre also found in corn, peas, potatoes, yam, acorn squash, and butternut squash. Starches also raise blood sugar.   · Fiber is found in foods such as vegetables, fruits, beans, and whole grains. Unlike other carbs, fiber isnt digested or absorbed. So it doesnt raise blood sugar. In fact, fiber can help keep blood sugar from rising too fast. It also helps keep blood cholesterol at a healthy level.  Did you know?  Even though carbohydrates raise blood sugar, its best to have some in every meal. They are an important part of a healthy diet.   Fat  Fat is an energy source that can be stored until needed. Fat does not raise blood sugar. However, it can raise blood cholesterol, increasing the risk of heart disease. Fat is also high in calories, which can cause weight gain. Not all types of fat are the same.  More Healthy:  · Monounsaturated fats are mostly found in vegetable oils, such as olive, canola, and peanut oils. They are also found in avocados and some nuts. Monounsaturated fats are healthy for your heart. Thats because they lower LDL (unhealthy) cholesterol.  · Polyunsaturated fats are mostly  found in vegetable oils, such as corn, safflower, and soybean oils. They are also found in some seeds, nuts, and fish. Polyunsaturated fats lower LDL (unhealthy) cholesterol. So, choosing them instead of saturated fats is healthy for your heart. Certain unsaturated fats can help lower triglycerides.   Less Healthy:  · Saturated fats are found in animal products, such as meat, poultry, whole milk, lard, and butter. Saturated fats raise LDL cholesterol and are not healthy for your heart.  · Hydrogenated oils and trans fats are formed when vegetable oils are processed into solid fats. They are found in many processed foods. Hydrogenated oils and trans fats raise LDL cholesterol and lower HDL (healthy) cholesterol. They are not healthy for your heart.  Protein  Protein helps the body build and repair muscle and other tissue. Protein has little or no effect on blood sugar. However, many foods that contain protein also contain saturated fat. By choosing low-fat protein sources, you can get the benefits of protein without the extra fat:  · Plant protein is found in dry beans and peas, nuts, and soy products, such as tofu and soymilk. These sources tend to be cholesterol-free and low in saturated fat.  · Animal protein is found in fish, poultry, meat, cheese, milk, and eggs. These contain cholesterol and can be high in saturated fat. Aim for lean, lower-fat choices.  Date Last Reviewed: 3/1/2016  © 6654-1247 [employer]. [address] [place] [address], [place] [address]. All rights reserved. This information is not intended as a substitute for professional medical care. Always follow your healthcare professional's instructions.

## 2020-12-16 NOTE — PROGRESS NOTES
SUBJECTIVE:      Patient ID: Oliver Vargas is a 45 y.o. male.    Chief Complaint: Diabetes    Mr Vargas is here today to f/u on DM and htn.  He was started on metformin at his previous ov. He has lost weight. A1c is 13.2  on labs. He is following with Dr Huber for elevated LFTs but missed his last appt. Following with Dr Moore for elevated ferritin.   Following with Dr Roberts for sleep apnea. Using cpap as prescribed. He has lost weight. He has quit smoking. Working full time at the [employer]. He is still drinking 3-4 beer several nights a week    Diabetes  He presents for his follow-up diabetic visit. He has type 2 diabetes mellitus. His disease course has been worsening. There are no hypoglycemic associated symptoms. Pertinent negatives for hypoglycemia include no confusion, dizziness, headaches, nervousness/anxiousness, pallor, seizures or speech difficulty. Associated symptoms include polydipsia and polyuria. Pertinent negatives for diabetes include no chest pain, no polyphagia and no weakness. There are no hypoglycemic complications. Symptoms are worsening. Risk factors for coronary artery disease include hypertension, male sex, obesity, dyslipidemia, diabetes mellitus and family history. When asked about current treatments, none were reported. He is following a generally healthy diet. An ACE inhibitor/angiotensin II receptor blocker is being taken. He does not see a podiatrist.Eye exam is not current.   Hypertension  The current episode started more than 1 month ago. The problem is controlled. Pertinent negatives include no chest pain, headaches, palpitations, peripheral edema or shortness of breath. Risk factors for coronary artery disease include diabetes mellitus, dyslipidemia, obesity, male gender and family history. Past treatments include ACE inhibitors. The current treatment provides moderate improvement.       History reviewed. No pertinent surgical history.  Family History   Problem  Relation Age of Onset    Hypertension Mother     Diabetes type II Father     Heart disease Father       Social History     Socioeconomic History    Marital status:      Spouse name: Not on file    Number of children: Not on file    Years of education: Not on file    Highest education level: Not on file   Occupational History    Not on file   Social Needs    Financial resource strain: Not hard at all    Food insecurity     Worry: Never true     Inability: Never true    Transportation needs     Medical: No     Non-medical: No   Tobacco Use    Smoking status: Former Smoker     Packs/day: 0.50     Types: Cigarettes    Smokeless tobacco: Never Used   Substance and Sexual Activity    Alcohol use: Yes     Frequency: 2-4 times a month     Drinks per session: 5 or 6     Binge frequency: Less than monthly    Drug use: Never    Sexual activity: Yes   Lifestyle    Physical activity     Days per week: 0 days     Minutes per session: 0 min    Stress: Rather much   Relationships    Social connections     Talks on phone: Once a week     Gets together: Never     Attends [employer] service: Not on file     Active member of club or organization: Yes     Attends meetings of clubs or organizations: Patient refused     Relationship status:    Other Topics Concern    Not on file   Social History Narrative    Not on file     Current Outpatient Medications   Medication Sig Dispense Refill    fluoride, sodium, (DENTAGEL) 1.1 % Gel APPLY 1.1% SODIUM FLUORIDE ORAL CREAM MOUTH TWICE A DAY      lisinopriL 10 MG tablet TAKE 1 TABLET(10 MG) BY MOUTH EVERY DAY 30 tablet 0    metFORMIN (GLUCOPHAGE-XR) 500 MG ER 24hr tablet Take 2 tablets (1,000 mg total) by mouth 2 (two) times daily with meals. 120 tablet 0    blood sugar diagnostic Strp To check BG one times daily, to use with insurance preferred meter 100 each 0    blood-glucose meter kit To check BG one times daily, to use with insurance preferred meter 1  "each 0    insulin degludec-liraglutide (XULTOPHY 100/3.6) 100 unit-3.6 mg /mL (3 mL) InPn Inject 10 Units into the skin once daily. 3 mL 1    lancets Misc To check BG one times daily, to use with insurance preferred meter 100 each 0     No current facility-administered medications for this visit.      Review of patient's allergies indicates:  No Known Allergies   Past Medical History:   Diagnosis Date    Hypertension      History reviewed. No pertinent surgical history.    Review of Systems   Constitutional: Negative for appetite change, chills, diaphoresis and unexpected weight change.   HENT: Negative for ear discharge, facial swelling, hearing loss, nosebleeds and trouble swallowing.    Eyes: Negative for photophobia, pain and visual disturbance.   Respiratory: Negative for apnea, choking, shortness of breath and wheezing.    Cardiovascular: Negative for chest pain and palpitations.   Gastrointestinal: Negative for abdominal pain, blood in stool and vomiting.   Endocrine: Positive for polydipsia and polyuria. Negative for polyphagia.   Genitourinary: Negative for difficulty urinating and hematuria.   Musculoskeletal: Negative for gait problem and joint swelling.   Skin: Negative for pallor.   Neurological: Negative for dizziness, seizures, speech difficulty, weakness, light-headedness and headaches.   Hematological: Does not bruise/bleed easily.   Psychiatric/Behavioral: Negative for agitation, confusion and dysphoric mood. The patient is not nervous/anxious.       OBJECTIVE:      Vitals:    12/16/20 1428   BP: 120/84   Pulse: 86   Temp: 97.9 °F (36.6 °C)   SpO2: 98%   Weight: 94.8 kg (209 lb)   Height: 5' 9" (1.753 m)     Physical Exam  Vitals signs and nursing note reviewed.   Constitutional:       General: He is not in acute distress.     Appearance: He is well-developed.   HENT:      Head: Normocephalic and atraumatic.      Nose: Nose normal.      Mouth/Throat:      Pharynx: Uvula midline.   Eyes:      " General: Lids are normal.      Conjunctiva/sclera: Conjunctivae normal.      Pupils: Pupils are equal, round, and reactive to light.      Right eye: Pupil is round and reactive.      Left eye: Pupil is round and reactive.   Neck:      Musculoskeletal: Normal range of motion and neck supple.      Thyroid: No thyromegaly.      Vascular: No carotid bruit.   Cardiovascular:      Rate and Rhythm: Normal rate and regular rhythm.      Pulses: Normal pulses.      Heart sounds: Normal heart sounds.   Pulmonary:      Effort: Pulmonary effort is normal.      Breath sounds: Normal breath sounds.   Abdominal:      General: Bowel sounds are normal.      Palpations: Abdomen is soft. Abdomen is not rigid. There is no hepatomegaly or splenomegaly.      Tenderness: There is no abdominal tenderness.   Musculoskeletal: Normal range of motion.   Lymphadenopathy:      Cervical: No cervical adenopathy.   Skin:     General: Skin is warm and dry.      Nails: There is no clubbing.     Neurological:      Mental Status: He is alert and oriented to person, place, and time.   Psychiatric:         Mood and Affect: Mood normal.         Speech: Speech normal.         Behavior: Behavior normal. Behavior is cooperative.         Thought Content: Thought content normal.         Telephone on 12/02/2020   Component Date Value Ref Range Status    WBC 12/04/2020 6.8  3.4 - 10.8 x10E3/uL Final    RBC 12/04/2020 4.75  4.14 - 5.80 x10E6/uL Final    Hemoglobin 12/04/2020 14.9  13.0 - 17.7 g/dL Final    Hematocrit 12/04/2020 44.0  37.5 - 51.0 % Final    MCV 12/04/2020 93  79 - 97 fL Final    MCH 12/04/2020 31.4  26.6 - 33.0 pg Final    MCHC 12/04/2020 33.9  31.5 - 35.7 g/dL Final    RDW 12/04/2020 12.7  11.6 - 15.4 % Final    Platelets 12/04/2020 230  150 - 450 x10E3/uL Final    Neutrophils 12/04/2020 51  Not Estab. % Final    Lymphs 12/04/2020 39  Not Estab. % Final    Monocytes 12/04/2020 7  Not Estab. % Final    Eos 12/04/2020 2  Not Estab. %  Final    Basos 12/04/2020 1  Not Estab. % Final    Neutrophils (Absolute) 12/04/2020 3.5  1.4 - 7.0 x10E3/uL Final    Lymphs (Absolute) 12/04/2020 2.6  0.7 - 3.1 x10E3/uL Final    Monocytes(Absolute) 12/04/2020 0.5  0.1 - 0.9 x10E3/uL Final    Eos (Absolute) 12/04/2020 0.1  0.0 - 0.4 x10E3/uL Final    Baso (Absolute) 12/04/2020 0.1  0.0 - 0.2 x10E3/uL Final    Immature Granulocytes 12/04/2020 0  Not Estab. % Final    Immature Grans (Abs) 12/04/2020 0.0  0.0 - 0.1 x10E3/uL Final    Glucose 12/04/2020 346* 65 - 99 mg/dL Final    BUN 12/04/2020 6  6 - 24 mg/dL Final    Creatinine 12/04/2020 0.74* 0.76 - 1.27 mg/dL Final    eGFR if non African American 12/04/2020 112  >59 mL/min/1.73 Final    eGFR if African American 12/04/2020 130  >59 mL/min/1.73 Final    BUN/Creatinine Ratio 12/04/2020 8* 9 - 20 Final    Sodium 12/04/2020 137  134 - 144 mmol/L Final    Potassium 12/04/2020 3.9  3.5 - 5.2 mmol/L Final    Chloride 12/04/2020 99  96 - 106 mmol/L Final    CO2 12/04/2020 22  20 - 29 mmol/L Final    Calcium 12/04/2020 9.4  8.7 - 10.2 mg/dL Final    Protein, Total 12/04/2020 7.4  6.0 - 8.5 g/dL Final    Albumin 12/04/2020 4.8  4.0 - 5.0 g/dL Final    Globulin, Total 12/04/2020 2.6  1.5 - 4.5 g/dL Final    Albumin/Globulin Ratio 12/04/2020 1.8  1.2 - 2.2 Final    Total Bilirubin 12/04/2020 0.8  0.0 - 1.2 mg/dL Final    Alkaline Phosphatase 12/04/2020 131* 39 - 117 IU/L Final    AST 12/04/2020 74* 0 - 40 IU/L Final    ALT 12/04/2020 144* 0 - 44 IU/L Final    Cholesterol 12/04/2020 251* 100 - 199 mg/dL Final    Triglycerides 12/04/2020 569* 0 - 149 mg/dL Final    HDL 12/04/2020 28* >39 mg/dL Final    VLDL Cholesterol Ryan 12/04/2020 102* 5 - 40 mg/dL Final    LDL Calculated 12/04/2020 121* 0 - 99 mg/dL Final    Hemoglobin A1c 12/04/2020 13.2* 4.8 - 5.6 % Final    Comment: **Verified by repeat analysis**           Prediabetes: 5.7 - 6.4           Diabetes: >6.4           Glycemic control for  adults with diabetes: <7.0     ]  Assessment:       1. Essential hypertension    2. Type 2 diabetes mellitus without complication, without long-term current use of insulin    3. Elevated liver enzymes    4. Need for influenza vaccination    5. Fatty liver        Plan:       Essential hypertension  Stable on current meds    Type 2 diabetes mellitus without complication, without long-term current use of insulin  - start    insulin degludec-liraglutide (XULTOPHY 100/3.6) 100 unit-3.6 mg /mL (3 mL) InPn; Inject 10 Units into the skin once daily.  Dispense: 3 mL; Refill: 1  -     CT Abdomen With Contrast; Future; Expected date: 12/16/2020  -     blood-glucose meter kit; To check BG one times daily, to use with insurance preferred meter  Dispense: 1 each; Refill: 0  -     lancets Misc; To check BG one times daily, to use with insurance preferred meter  Dispense: 100 each; Refill: 0  -     blood sugar diagnostic Strp; To check BG one times daily, to use with insurance preferred meter  Dispense: 100 each; Refill: 0  Patient advised to increase insulin 2 units every 3 days for glucose >150. Voiced understanding  Cont metformin    Elevated liver enzymes  -     CT Abdomen With Contrast; Future; Expected date: 12/16/2020    Need for influenza vaccination  -     Influenza - Quadrivalent (PF)    Fatty liver  -     CT Abdomen With Contrast; Future; Expected date: 12/16/2020        Follow up in about 2 months (around 2/16/2021) for dm.      12/16/2020 KANDI Pahm, FNP

## 2020-12-17 ENCOUNTER — PATIENT MESSAGE (OUTPATIENT)
Dept: FAMILY MEDICINE | Facility: CLINIC | Age: 45
End: 2020-12-17

## 2020-12-17 NOTE — TELEPHONE ENCOUNTER
Medical necessity form completed. Do I need to send any meds to [facility] for him or does he have what he needs. He will need to notify us if they do not approve the xultophy

## 2020-12-18 DIAGNOSIS — I10 ESSENTIAL HYPERTENSION: ICD-10-CM

## 2020-12-18 DIAGNOSIS — E11.9 TYPE 2 DIABETES MELLITUS WITHOUT COMPLICATION, WITHOUT LONG-TERM CURRENT USE OF INSULIN: ICD-10-CM

## 2020-12-18 RX ORDER — METFORMIN HYDROCHLORIDE 500 MG/1
1000 TABLET, EXTENDED RELEASE ORAL 2 TIMES DAILY WITH MEALS
Qty: 120 TABLET | Refills: 0 | Status: SHIPPED | OUTPATIENT
Start: 2020-12-18 | End: 2021-01-12

## 2020-12-18 RX ORDER — LISINOPRIL 10 MG/1
10 TABLET ORAL DAILY
Qty: 30 TABLET | Refills: 0 | Status: SHIPPED | OUTPATIENT
Start: 2020-12-18 | End: 2021-01-12

## 2020-12-18 RX ORDER — LANCETS
EACH MISCELLANEOUS
Qty: 100 EACH | Refills: 0 | Status: SHIPPED | OUTPATIENT
Start: 2020-12-18 | End: 2021-03-15

## 2020-12-21 ENCOUNTER — TELEPHONE (OUTPATIENT)
Dept: FAMILY MEDICINE | Facility: CLINIC | Age: 45
End: 2020-12-21

## 2020-12-21 DIAGNOSIS — E11.9 TYPE 2 DIABETES MELLITUS WITHOUT COMPLICATION, WITHOUT LONG-TERM CURRENT USE OF INSULIN: ICD-10-CM

## 2020-12-21 NOTE — TELEPHONE ENCOUNTER
Medical Necessity form for Xultophy faxed again. Alexei told pt today it could take 24 hours to process. Pt states he is only taking metformin at this time.

## 2020-12-21 NOTE — TELEPHONE ENCOUNTER
The following medication needs a prior authorization:     Medication Name: degludec-liraglutide     Dosage: 100/36. Mg/ml    Frequency: daily    Directions for use: inject 10 units into the skin once daily    Diagnosis: type 2 diabetes    Is the request for a reauthorization?     Is the patient currently stable on therapy?     Please list all therapeutic alternatives previously used with start/end dates and outcome:

## 2020-12-22 RX ORDER — (INSULIN DEGLUDEC AND LIRAGLUTIDE) 100; 3.6 [IU]/ML; MG/ML
10 INJECTION, SOLUTION SUBCUTANEOUS DAILY
Qty: 9 ML | Refills: 0 | Status: SHIPPED | OUTPATIENT
Start: 2020-12-22 | End: 2020-12-23

## 2020-12-22 NOTE — TELEPHONE ENCOUNTER
Pt states he spoke to Alexei and they received the medical necessity form but they need you to send the script for Xultophy 90 day supply. Thank you!

## 2020-12-23 ENCOUNTER — TELEPHONE (OUTPATIENT)
Dept: FAMILY MEDICINE | Facility: CLINIC | Age: 45
End: 2020-12-23

## 2020-12-23 ENCOUNTER — HOSPITAL ENCOUNTER (OUTPATIENT)
Dept: RADIOLOGY | Facility: HOSPITAL | Age: 45
Discharge: HOME | End: 2020-12-23
Attending: NURSE PRACTITIONER
Payer: OTHER GOVERNMENT

## 2020-12-23 DIAGNOSIS — R74.8 ELEVATED LIVER ENZYMES: ICD-10-CM

## 2020-12-23 DIAGNOSIS — E11.9 TYPE 2 DIABETES MELLITUS WITHOUT COMPLICATION, WITHOUT LONG-TERM CURRENT USE OF INSULIN: ICD-10-CM

## 2020-12-23 DIAGNOSIS — K76.0 FATTY LIVER: ICD-10-CM

## 2020-12-23 PROCEDURE — 25500020 PHARM REV CODE 255: Mod: PO | Performed by: NURSE PRACTITIONER

## 2020-12-23 PROCEDURE — 74170 CT ABD WO CNTRST FLWD CNTRST: CPT | Mod: TC,PO

## 2020-12-23 RX ORDER — DULAGLUTIDE 0.75 MG/.5ML
0.75 INJECTION, SOLUTION SUBCUTANEOUS
Qty: 2 PEN | Refills: 0 | Status: SHIPPED | OUTPATIENT
Start: 2020-12-23 | End: 2020-12-28 | Stop reason: SDUPTHER

## 2020-12-23 RX ADMIN — IOHEXOL 100 ML: 350 INJECTION, SOLUTION INTRAVENOUS at 01:12

## 2020-12-23 NOTE — TELEPHONE ENCOUNTER
----- Message from Dawit Ayon NP sent at 12/23/2020  3:04 PM CST -----  Nodular hepatic contour, please fax to Dr Huber

## 2020-12-23 NOTE — TELEPHONE ENCOUNTER
trulicity sent, he will cont metformin. I spoke with the patient. He states he has quit drinking alcohol during the week. He is feeling good. Will call in 2 weeks with glucose readings and orders for new trulicity pen

## 2020-12-28 DIAGNOSIS — E11.9 TYPE 2 DIABETES MELLITUS WITHOUT COMPLICATION, WITHOUT LONG-TERM CURRENT USE OF INSULIN: ICD-10-CM

## 2020-12-28 RX ORDER — DULAGLUTIDE 0.75 MG/.5ML
0.75 INJECTION, SOLUTION SUBCUTANEOUS
Qty: 2 PEN | Refills: 0 | Status: SHIPPED | OUTPATIENT
Start: 2020-12-28 | End: 2020-12-29 | Stop reason: SDUPTHER

## 2020-12-29 DIAGNOSIS — E11.9 TYPE 2 DIABETES MELLITUS WITHOUT COMPLICATION, WITHOUT LONG-TERM CURRENT USE OF INSULIN: ICD-10-CM

## 2020-12-29 RX ORDER — DULAGLUTIDE 0.75 MG/.5ML
0.75 INJECTION, SOLUTION SUBCUTANEOUS
Qty: 4 PEN | Refills: 0 | Status: SHIPPED | OUTPATIENT
Start: 2020-12-29 | End: 2021-02-04

## 2021-02-03 DIAGNOSIS — E11.9 TYPE 2 DIABETES MELLITUS WITHOUT COMPLICATION, WITHOUT LONG-TERM CURRENT USE OF INSULIN: ICD-10-CM

## 2021-02-04 ENCOUNTER — CLINICAL SUPPORT (OUTPATIENT)
Dept: SMOKING CESSATION | Facility: CLINIC | Age: 46
End: 2021-02-04
Payer: COMMERCIAL

## 2021-02-04 DIAGNOSIS — F17.200 NICOTINE DEPENDENCE: Primary | ICD-10-CM

## 2021-02-04 PROCEDURE — 99407 BEHAV CHNG SMOKING > 10 MIN: CPT | Mod: S$GLB,,, | Performed by: INTERNAL MEDICINE

## 2021-02-04 PROCEDURE — 99407 PR TOBACCO USE CESSATION INTENSIVE >10 MINUTES: ICD-10-PCS | Mod: S$GLB,,, | Performed by: INTERNAL MEDICINE

## 2021-02-04 RX ORDER — DULAGLUTIDE 0.75 MG/.5ML
0.75 INJECTION, SOLUTION SUBCUTANEOUS
Qty: 4 PEN | Refills: 0 | Status: SHIPPED | OUTPATIENT
Start: 2021-02-04 | End: 2021-02-18

## 2021-03-08 ENCOUNTER — TELEPHONE (OUTPATIENT)
Dept: FAMILY MEDICINE | Facility: CLINIC | Age: 46
End: 2021-03-08

## 2021-03-08 DIAGNOSIS — I10 ESSENTIAL HYPERTENSION: ICD-10-CM

## 2021-03-08 DIAGNOSIS — E78.5 HYPERLIPIDEMIA, UNSPECIFIED HYPERLIPIDEMIA TYPE: ICD-10-CM

## 2021-03-08 DIAGNOSIS — E11.9 TYPE 2 DIABETES MELLITUS WITHOUT COMPLICATION, WITHOUT LONG-TERM CURRENT USE OF INSULIN: Primary | ICD-10-CM

## 2021-03-10 ENCOUNTER — TELEPHONE (OUTPATIENT)
Dept: FAMILY MEDICINE | Facility: CLINIC | Age: 46
End: 2021-03-10

## 2021-03-10 LAB
ALBUMIN SERPL-MCNC: 4.7 G/DL (ref 4–5)
ALBUMIN/CREAT UR: 6 MG/G CREAT (ref 0–29)
ALBUMIN/GLOB SERPL: 1.9 {RATIO} (ref 1.2–2.2)
ALP SERPL-CCNC: 63 IU/L (ref 39–117)
ALT SERPL-CCNC: 21 IU/L (ref 0–44)
AST SERPL-CCNC: 22 IU/L (ref 0–40)
BILIRUB SERPL-MCNC: 0.5 MG/DL (ref 0–1.2)
BUN SERPL-MCNC: 8 MG/DL (ref 6–24)
BUN/CREAT SERPL: 11 (ref 9–20)
CALCIUM SERPL-MCNC: 9.8 MG/DL (ref 8.7–10.2)
CHLORIDE SERPL-SCNC: 101 MMOL/L (ref 96–106)
CHOLEST SERPL-MCNC: 160 MG/DL (ref 100–199)
CO2 SERPL-SCNC: 21 MMOL/L (ref 20–29)
CREAT SERPL-MCNC: 0.74 MG/DL (ref 0.76–1.27)
CREAT UR-MCNC: 59.7 MG/DL
GLOBULIN SER CALC-MCNC: 2.5 G/DL (ref 1.5–4.5)
GLUCOSE SERPL-MCNC: 72 MG/DL (ref 65–99)
HBA1C MFR BLD: 5.7 % (ref 4.8–5.6)
HDLC SERPL-MCNC: 52 MG/DL
LDLC SERPL CALC-MCNC: 93 MG/DL (ref 0–99)
MICROALBUMIN UR-MCNC: 3.4 UG/ML
POTASSIUM SERPL-SCNC: 4.2 MMOL/L (ref 3.5–5.2)
PROT SERPL-MCNC: 7.2 G/DL (ref 6–8.5)
SODIUM SERPL-SCNC: 137 MMOL/L (ref 134–144)
TRIGL SERPL-MCNC: 79 MG/DL (ref 0–149)
VLDLC SERPL CALC-MCNC: 15 MG/DL (ref 5–40)

## 2021-03-11 ENCOUNTER — OFFICE VISIT (OUTPATIENT)
Dept: FAMILY MEDICINE | Facility: CLINIC | Age: 46
End: 2021-03-11
Payer: OTHER GOVERNMENT

## 2021-03-11 VITALS
OXYGEN SATURATION: 97 % | WEIGHT: 171 LBS | SYSTOLIC BLOOD PRESSURE: 110 MMHG | TEMPERATURE: 99 F | DIASTOLIC BLOOD PRESSURE: 70 MMHG | BODY MASS INDEX: 25.33 KG/M2 | HEART RATE: 96 BPM | HEIGHT: 69 IN

## 2021-03-11 DIAGNOSIS — E78.5 HYPERLIPIDEMIA, UNSPECIFIED HYPERLIPIDEMIA TYPE: ICD-10-CM

## 2021-03-11 DIAGNOSIS — E11.9 TYPE 2 DIABETES MELLITUS WITHOUT COMPLICATION, WITHOUT LONG-TERM CURRENT USE OF INSULIN: Primary | ICD-10-CM

## 2021-03-11 DIAGNOSIS — I10 ESSENTIAL HYPERTENSION: ICD-10-CM

## 2021-03-11 PROCEDURE — 99214 PR OFFICE/OUTPT VISIT, EST, LEVL IV, 30-39 MIN: ICD-10-PCS | Mod: S$GLB,,, | Performed by: NURSE PRACTITIONER

## 2021-03-11 PROCEDURE — 99214 OFFICE O/P EST MOD 30 MIN: CPT | Mod: S$GLB,,, | Performed by: NURSE PRACTITIONER

## 2021-03-11 RX ORDER — METFORMIN HYDROCHLORIDE 500 MG/1
500 TABLET, EXTENDED RELEASE ORAL 2 TIMES DAILY WITH MEALS
Qty: 180 TABLET | Refills: 1 | Status: SHIPPED | OUTPATIENT
Start: 2021-03-11 | End: 2021-09-13 | Stop reason: SDUPTHER

## 2021-03-11 RX ORDER — LISINOPRIL 10 MG/1
10 TABLET ORAL DAILY
Qty: 90 TABLET | Refills: 1 | Status: SHIPPED | OUTPATIENT
Start: 2021-03-11 | End: 2021-09-13 | Stop reason: SDUPTHER

## 2021-04-14 ENCOUNTER — TELEPHONE (OUTPATIENT)
Dept: FAMILY MEDICINE | Facility: CLINIC | Age: 46
End: 2021-04-14

## 2021-04-14 DIAGNOSIS — R79.89 ELEVATED FERRITIN: Primary | ICD-10-CM

## 2021-05-02 NOTE — PROGRESS NOTES
Individual Follow-Up Form    8/20/2020    Quit Date:     Clinical Status of Patient: Outpatient    Length of Service: 60 minutes    Continuing Medication: yes  Chantix    Other Medications:      Target Symptoms: Withdrawal and medication side effects. The following were  rated moderate (3) to severe (4) on TCRS:  · Moderate (3): None  · Severe (4): None    Comments: Patient remains tobacco free at this time. He is managing withdrawal symptoms well at this time. Today we discussed and reviewed the information from Group session #4: strategies, habitual behavior, stress, and high risk situations. Introduced stress with addition interventions, SOLVE, relaxation with interventions, nutrition, exercise, weight gain, and the importance of rewarding oneself for accomplishments for becoming tobacco free.  He has his sights set on a saving for a [place] as his reward. Open discussion of all items with interventions. CO Monitored today, reading = 2 ppm.   The patient remains on the prescribed tobacco cessation medication regimen of 1 mg Chantix BID without any negative side effects at this time. The patient will continue to come to the clinic for additional support and encouragement.     Diagnosis: F17.200    Next Visit: 2 weeks     [facility] Hospitalist Service Progress Note    Briefly, 78-year-old female with significant past medical history of diabetes mellitus and GERD presented to the hospital with abdominal pain, nausea and vomiting. She was initially diagnosed with UTI and she was treated with ceftriaxone. In addition, her initial CT was concerning for small bowel obstruction which was reviewed by surgeon. Patient was treated for ileus and she had 2 bowel movement. Patient improved but on the night of April 30, patient started having some belly pain and abdominal distention. X-ray on May 1 was consistent with SBO. General surgery consulted. Patient started on NG tube with low intermittent suction on May 1. May 1: Overnight patient developed severe abdominal pain and her abdominal distention got worse. She denies any nausea or vomiting at present. Denies any fever, chills, chest pain, palpitations. Rest review of system negative except mentioned above. May 2: Patient found to have SBO yesterday. NG tube was placed with low intermittent suction. Also patient blood pressure is persistently elevated and she feels palpitation after hydralazine IV in spite with low dose. Still have some abdominal distention but no significant pain. Denies any chest pain, palpitations or shortness of breath. Denies any nausea or vomiting. As per patient her nausea is much better. Rest review of system negative except mentioned above. Assessment / Plan:    Distended loops of bowel, possible small bowel obstruction  Initially treated for ileus. Now currently most likely SBO. General surgery consulted. NPO. NG tube with low intermittent suction. IV fluid hydration. Replete potassium for level greater than 4. TSH within normal limit. Conservative management. May 2: Consistent with SBO. On conservative management with NG tube at low intermittent suction.       E. coli UTI: Patient was treated on ceftriaxone for 4 days but patient still symptomatic so we will give 3 more days of ceftriaxone. Diabetes mellitus type 2  Sliding scale insulin. Significantly elevated blood pressure: Hydralazine ordered. After hydralazine patient felt that her heart rate was racing and her heart rate was 84. We will decrease the dose of hydralazine. Amlodipine started. May 2: Stop hydralazine. Given patient is n.p.o. and on NG tube suction so p.o. medication would not be helpful. Discussed side effects and benefit and will start patient on low-dose clonidine patch. As needed labetalol. We will continue to monitor. SCDs for DVT prophylaxis given unsure if patient will need surgery. Patient is AOx3. Patient  was present at bedside. Passively discuss the plan with patient and patient . Both verbalized understanding that her condition meditated in spite of treatment. Objective:  Visit Vitals  BP (!) 159/66 (BP 1 Location: Right upper arm, BP Patient Position: At rest)   Pulse (!) 114   Temp 98.1 °F (36.7 °C)   Resp 18   Ht 5' 4\" (1.626 m)   Wt 60.8 kg (134 lb)   SpO2 92%   BMI 23.00 kg/m²                 Physical Exam:  General: Mildly ill-appearing owing to abdominal pain  HEENT: Extraocular muscle seems intact. Mucous membranes slightly dry.   Neck: Supple , no JVD   Lungs: Clear to auscultation bilaterally   Cardiovascular: Regular rate and rhythm with normal S1 and S2   Abdomen: Soft, distended with some tenderness, bowel sounds are hyperactive some  extremities: No cyanosis clubbing or edema   Neuro: Moving all 4 extremities, speech normal l, A&O x3   Psych: Normal affect   Procedures done this admission:  * No surgery found *    All Micro Results     Procedure Component Value Units Date/Time    CULTURE, BLOOD [[id number]] Collected: 04/27/21 2036    Order Status: Completed Specimen: Blood Updated: 05/02/21 0742     Special Requests: --        RIGHT  Antecubital       Culture result: NO GROWTH 5 DAYS       CULTURE, BLOOD [[id number]] Collected: 04/27/21 1854    Order Status: Completed Specimen: Blood Updated: 05/02/21 0742     Special Requests: --        NO SPECIAL REQUESTS  LEFT  HAND       Culture result: NO GROWTH 5 DAYS             SARS-CoV-2 Lab Results  \"Novel Coronavirus\" Test: No results found for: COV2NT   \"Emergent Disease\" Test: No results found for: EDPR  \"SARS-COV-2\" Test: No results found for: XGCOVT  \"Precision Labs\" Test: No results found for: RSLT  Rapid Test: No results found for: COVR         Labs: Results:       BMP, Mg, Phos Recent Labs     05/02/21 0828 05/01/21 0755 04/30/21  0249    135* 135*   K 4.0 3.3* 4.5    102 105   CO2 22 26 25   AGAP 10 7 5*   BUN 12 13 13   CREA 0.45* 0.49* 0.44*   CA 8.1* 8.0* 8.4   * 195* 129*   MG 2.1 1.6*  --       CBC Recent Labs     05/02/21 0828 05/01/21 0755 04/30/21  0249   WBC 13.0* 8.0 8.4   RBC 3.99* 3.66* 3.68*   HGB 13.2 12.2 12.2   HCT 38.0 34.4* 35.1*    210 195   GRANS 83* 77 71   LYMPH 7* 11* 14   EOS 1 2 2   MONOS 9 9 12   BASOS 0 0 1   IG 0 0 0   ANEU 10.8* 6.2 6.0   ABL 0.9 0.9 1.1   TAMMY 0.1 0.1 0.2   ABM 1.2 0.8 1.0   ABB 0.0 0.0 0.0   AIG 0.0 0.0 0.0      LFT Recent Labs     05/02/21 0828 05/01/21 0755 04/30/21  0249   ALT 19 11* 11*   AP 46* 41* 39*   TP 5.4* 5.2* 5.5*   ALB 2.9* 2.6* 2.7*   GLOB 2.5 2.6 2.8   AGRAT 1.2 1.0* 1.0*      Cardiac Testing Lab Results   Component Value Date/Time    Troponin-I <0.05 12/30/2010 05:07 AM    Troponin-I <0.05 12/30/2010 02:12 AM      Coagulation Tests No results found for: PTP, INR, APTT, INREXT, INREXT   A1c Lab Results   Component Value Date/Time    Hemoglobin A1c 6.4 (H) 04/21/2021 10:34 AM    Hemoglobin A1c 6.7 (H) 10/02/2020 10:11 AM    Hemoglobin A1c 7.7 (H) 06/30/2020 12:00 PM      Lipid Panel Lab Results   Component Value Date/Time    Cholesterol, total 116 04/21/2021 10:34 AM    HDL Cholesterol 63 04/21/2021 10:34 AM    LDL, calculated 39 04/21/2021 10:34 AM    LDL, calculated 34 06/30/2020 12:00 PM    VLDL, calculated 14 04/21/2021 10:34 AM    VLDL, calculated 23 06/30/2020 12:00 PM    Triglyceride 69 04/21/2021 10:34 AM    CHOL/HDL Ratio 4.1 05/26/2016 08:58 AM      Thyroid Panel Lab Results   Component Value Date/Time    TSH 2.700 04/21/2021 10:34 AM    TSH 2.900 09/03/2019 08:46 AM        Most Recent UA Lab Results   Component Value Date/Time    Color Yellow 09/03/2019 08:46 AM    Appearance Clear 09/03/2019 08:46 AM    pH (UA) 5.0 06/07/2017 08:52 AM    Protein Negative 06/07/2017 08:52 AM    Glucose Negative 06/07/2017 08:52 AM    Ketone Negative 06/07/2017 08:52 AM    Bilirubin Negative 06/07/2017 08:52 AM    Blood Negative 06/07/2017 08:52 AM    Urobilinogen 0.2 E.U./dL 06/07/2017 08:52 AM    Nitrites Positive (A) 06/07/2017 08:52 AM    Leukocyte Esterase Trace (A) 06/07/2017 08:52 AM    WBC 5-10 04/26/2021 12:43 PM    RBC 0-3 04/26/2021 12:43 PM    Epithelial cells 0-3 04/26/2021 12:43 PM    Bacteria 4+ (H) 04/26/2021 12:43 PM    Casts 3-5 04/26/2021 12:43 PM          Daryl Kelly MD  5/2/2021

## 2021-05-10 ENCOUNTER — OFFICE VISIT (OUTPATIENT)
Dept: HEMATOLOGY/ONCOLOGY | Facility: CLINIC | Age: 46
End: 2021-05-10
Payer: OTHER GOVERNMENT

## 2021-05-10 VITALS
BODY MASS INDEX: 24.73 KG/M2 | HEART RATE: 81 BPM | SYSTOLIC BLOOD PRESSURE: 116 MMHG | DIASTOLIC BLOOD PRESSURE: 77 MMHG | HEIGHT: 69 IN | WEIGHT: 167 LBS | RESPIRATION RATE: 18 BRPM

## 2021-05-10 DIAGNOSIS — R79.89 ELEVATED FERRITIN: ICD-10-CM

## 2021-05-10 DIAGNOSIS — D75.1 POLYCYTHEMIA, SECONDARY: Primary | ICD-10-CM

## 2021-05-10 DIAGNOSIS — F17.200 CURRENT EVERY DAY SMOKER: ICD-10-CM

## 2021-05-10 DIAGNOSIS — G47.33 OBSTRUCTIVE SLEEP APNEA SYNDROME: ICD-10-CM

## 2021-05-10 PROCEDURE — 99214 OFFICE O/P EST MOD 30 MIN: CPT | Mod: S$GLB,,, | Performed by: INTERNAL MEDICINE

## 2021-05-10 PROCEDURE — 99214 PR OFFICE/OUTPT VISIT, EST, LEVL IV, 30-39 MIN: ICD-10-PCS | Mod: S$GLB,,, | Performed by: INTERNAL MEDICINE

## 2021-05-12 ENCOUNTER — PATIENT MESSAGE (OUTPATIENT)
Dept: RESEARCH | Facility: HOSPITAL | Age: 46
End: 2021-05-12

## 2021-06-28 ENCOUNTER — TELEPHONE (OUTPATIENT)
Dept: HEMATOLOGY/ONCOLOGY | Facility: CLINIC | Age: 46
End: 2021-06-28

## 2021-07-28 ENCOUNTER — TELEPHONE (OUTPATIENT)
Dept: SMOKING CESSATION | Facility: CLINIC | Age: 46
End: 2021-07-28

## 2021-09-13 ENCOUNTER — OFFICE VISIT (OUTPATIENT)
Dept: FAMILY MEDICINE | Facility: CLINIC | Age: 46
End: 2021-09-13
Payer: OTHER GOVERNMENT

## 2021-09-13 VITALS
BODY MASS INDEX: 24.56 KG/M2 | OXYGEN SATURATION: 98 % | SYSTOLIC BLOOD PRESSURE: 100 MMHG | WEIGHT: 165.81 LBS | HEIGHT: 69 IN | DIASTOLIC BLOOD PRESSURE: 70 MMHG | HEART RATE: 70 BPM

## 2021-09-13 DIAGNOSIS — E78.5 HYPERLIPIDEMIA, UNSPECIFIED HYPERLIPIDEMIA TYPE: ICD-10-CM

## 2021-09-13 DIAGNOSIS — I10 ESSENTIAL HYPERTENSION: ICD-10-CM

## 2021-09-13 DIAGNOSIS — E11.9 TYPE 2 DIABETES MELLITUS WITHOUT COMPLICATION, WITHOUT LONG-TERM CURRENT USE OF INSULIN: Primary | ICD-10-CM

## 2021-09-13 PROCEDURE — 99214 PR OFFICE/OUTPT VISIT, EST, LEVL IV, 30-39 MIN: ICD-10-PCS | Mod: S$GLB,,, | Performed by: NURSE PRACTITIONER

## 2021-09-13 PROCEDURE — 99214 OFFICE O/P EST MOD 30 MIN: CPT | Mod: S$GLB,,, | Performed by: NURSE PRACTITIONER

## 2021-09-13 RX ORDER — DULAGLUTIDE 0.75 MG/.5ML
INJECTION, SOLUTION SUBCUTANEOUS
Qty: 12 PEN | Refills: 0 | Status: SHIPPED | OUTPATIENT
Start: 2021-09-13 | End: 2022-03-14

## 2021-09-13 RX ORDER — LISINOPRIL 10 MG/1
10 TABLET ORAL DAILY
Qty: 90 TABLET | Refills: 1 | Status: SHIPPED | OUTPATIENT
Start: 2021-09-13 | End: 2022-03-14 | Stop reason: SDUPTHER

## 2021-09-13 RX ORDER — METFORMIN HYDROCHLORIDE 500 MG/1
500 TABLET, EXTENDED RELEASE ORAL 2 TIMES DAILY WITH MEALS
Qty: 180 TABLET | Refills: 1 | Status: SHIPPED | OUTPATIENT
Start: 2021-09-13 | End: 2022-03-14 | Stop reason: SDUPTHER

## 2021-09-23 LAB
ALBUMIN SERPL-MCNC: 4.6 G/DL (ref 4–5)
ALBUMIN/GLOB SERPL: 1.9 {RATIO} (ref 1.2–2.2)
ALP SERPL-CCNC: 63 IU/L (ref 44–121)
ALT SERPL-CCNC: 20 IU/L (ref 0–44)
AST SERPL-CCNC: 25 IU/L (ref 0–40)
BILIRUB SERPL-MCNC: 0.7 MG/DL (ref 0–1.2)
BUN SERPL-MCNC: 8 MG/DL (ref 6–24)
BUN/CREAT SERPL: 11 (ref 9–20)
CALCIUM SERPL-MCNC: 9.9 MG/DL (ref 8.7–10.2)
CHLORIDE SERPL-SCNC: 100 MMOL/L (ref 96–106)
CHOLEST SERPL-MCNC: 169 MG/DL (ref 100–199)
CO2 SERPL-SCNC: 23 MMOL/L (ref 20–29)
CREAT SERPL-MCNC: 0.73 MG/DL (ref 0.76–1.27)
GLOBULIN SER CALC-MCNC: 2.4 G/DL (ref 1.5–4.5)
GLUCOSE SERPL-MCNC: 73 MG/DL (ref 65–99)
HBA1C MFR BLD: 5.1 % (ref 4.8–5.6)
HDLC SERPL-MCNC: 55 MG/DL
LDLC SERPL CALC-MCNC: 98 MG/DL (ref 0–99)
POTASSIUM SERPL-SCNC: 4 MMOL/L (ref 3.5–5.2)
PROT SERPL-MCNC: 7 G/DL (ref 6–8.5)
SODIUM SERPL-SCNC: 137 MMOL/L (ref 134–144)
TRIGL SERPL-MCNC: 89 MG/DL (ref 0–149)
VLDLC SERPL CALC-MCNC: 16 MG/DL (ref 5–40)

## 2021-09-27 ENCOUNTER — TELEPHONE (OUTPATIENT)
Dept: FAMILY MEDICINE | Facility: CLINIC | Age: 46
End: 2021-09-27

## 2021-10-04 ENCOUNTER — OFFICE VISIT (OUTPATIENT)
Dept: PULMONOLOGY | Facility: CLINIC | Age: 46
End: 2021-10-04
Payer: OTHER GOVERNMENT

## 2021-10-04 VITALS
DIASTOLIC BLOOD PRESSURE: 78 MMHG | SYSTOLIC BLOOD PRESSURE: 100 MMHG | OXYGEN SATURATION: 97 % | BODY MASS INDEX: 24.51 KG/M2 | WEIGHT: 166 LBS | HEART RATE: 77 BPM

## 2021-10-04 DIAGNOSIS — Z87.891 FORMER SMOKER: ICD-10-CM

## 2021-10-04 DIAGNOSIS — E66.9 OBESITY (BMI 30.0-34.9): ICD-10-CM

## 2021-10-04 DIAGNOSIS — G47.33 OSA (OBSTRUCTIVE SLEEP APNEA): Primary | ICD-10-CM

## 2021-10-04 DIAGNOSIS — I10 HYPERTENSION, UNSPECIFIED TYPE: ICD-10-CM

## 2021-10-04 PROCEDURE — 99214 PR OFFICE/OUTPT VISIT, EST, LEVL IV, 30-39 MIN: ICD-10-PCS | Mod: S$GLB,,, | Performed by: INTERNAL MEDICINE

## 2021-10-04 PROCEDURE — 99214 OFFICE O/P EST MOD 30 MIN: CPT | Mod: S$GLB,,, | Performed by: INTERNAL MEDICINE

## 2021-11-01 ENCOUNTER — OFFICE VISIT (OUTPATIENT)
Dept: PULMONOLOGY | Facility: CLINIC | Age: 46
End: 2021-11-01
Payer: OTHER GOVERNMENT

## 2021-11-01 VITALS
HEART RATE: 88 BPM | BODY MASS INDEX: 24.96 KG/M2 | OXYGEN SATURATION: 97 % | SYSTOLIC BLOOD PRESSURE: 120 MMHG | WEIGHT: 169 LBS | DIASTOLIC BLOOD PRESSURE: 80 MMHG

## 2021-11-01 DIAGNOSIS — G47.33 OSA (OBSTRUCTIVE SLEEP APNEA): Primary | ICD-10-CM

## 2021-11-01 DIAGNOSIS — Z87.891 FORMER SMOKER: ICD-10-CM

## 2021-11-01 DIAGNOSIS — I10 HYPERTENSION, UNSPECIFIED TYPE: ICD-10-CM

## 2021-11-01 PROCEDURE — 99213 PR OFFICE/OUTPT VISIT, EST, LEVL III, 20-29 MIN: ICD-10-PCS | Mod: 25,S$GLB,, | Performed by: INTERNAL MEDICINE

## 2021-11-01 PROCEDURE — 90686 IIV4 VACC NO PRSV 0.5 ML IM: CPT | Mod: S$GLB,,, | Performed by: INTERNAL MEDICINE

## 2021-11-01 PROCEDURE — 99213 OFFICE O/P EST LOW 20 MIN: CPT | Mod: 25,S$GLB,, | Performed by: INTERNAL MEDICINE

## 2021-11-01 PROCEDURE — 90471 FLU VACCINE (QUAD) GREATER THAN OR EQUAL TO 3YO PRESERVATIVE FREE IM: ICD-10-PCS | Mod: S$GLB,,, | Performed by: INTERNAL MEDICINE

## 2021-11-01 PROCEDURE — 90686 FLU VACCINE (QUAD) GREATER THAN OR EQUAL TO 3YO PRESERVATIVE FREE IM: ICD-10-PCS | Mod: S$GLB,,, | Performed by: INTERNAL MEDICINE

## 2021-11-01 PROCEDURE — 90471 IMMUNIZATION ADMIN: CPT | Mod: S$GLB,,, | Performed by: INTERNAL MEDICINE

## 2021-11-01 RX ORDER — HYDROCODONE BITARTRATE AND ACETAMINOPHEN 5; 325 MG/1; MG/1
TABLET ORAL
COMMUNITY
Start: 2021-05-03 | End: 2022-09-15

## 2021-11-01 RX ORDER — AMOXICILLIN 500 MG/1
TABLET, FILM COATED ORAL
COMMUNITY
Start: 2021-05-03 | End: 2022-03-14

## 2021-11-01 RX ORDER — IBUPROFEN 800 MG/1
TABLET ORAL
COMMUNITY
Start: 2021-05-03 | End: 2022-09-15

## 2022-03-14 ENCOUNTER — OFFICE VISIT (OUTPATIENT)
Dept: FAMILY MEDICINE | Facility: CLINIC | Age: 47
End: 2022-03-14
Payer: OTHER GOVERNMENT

## 2022-03-14 VITALS
WEIGHT: 187 LBS | SYSTOLIC BLOOD PRESSURE: 110 MMHG | DIASTOLIC BLOOD PRESSURE: 80 MMHG | HEART RATE: 92 BPM | OXYGEN SATURATION: 99 % | BODY MASS INDEX: 27.7 KG/M2 | HEIGHT: 69 IN | TEMPERATURE: 98 F

## 2022-03-14 DIAGNOSIS — E11.9 TYPE 2 DIABETES MELLITUS WITHOUT COMPLICATION, WITHOUT LONG-TERM CURRENT USE OF INSULIN: ICD-10-CM

## 2022-03-14 DIAGNOSIS — Z12.11 COLON CANCER SCREENING: ICD-10-CM

## 2022-03-14 DIAGNOSIS — Z00.00 PREVENTATIVE HEALTH CARE: Primary | ICD-10-CM

## 2022-03-14 DIAGNOSIS — I10 ESSENTIAL HYPERTENSION: ICD-10-CM

## 2022-03-14 PROCEDURE — 99396 PR PREVENTIVE VISIT,EST,40-64: ICD-10-PCS | Mod: S$GLB,,, | Performed by: NURSE PRACTITIONER

## 2022-03-14 PROCEDURE — 99396 PREV VISIT EST AGE 40-64: CPT | Mod: S$GLB,,, | Performed by: NURSE PRACTITIONER

## 2022-03-14 RX ORDER — LISINOPRIL 10 MG/1
10 TABLET ORAL DAILY
Qty: 90 TABLET | Refills: 1 | Status: SHIPPED | OUTPATIENT
Start: 2022-03-14 | End: 2022-06-09

## 2022-03-14 RX ORDER — METFORMIN HYDROCHLORIDE 500 MG/1
500 TABLET, EXTENDED RELEASE ORAL 2 TIMES DAILY WITH MEALS
Qty: 180 TABLET | Refills: 1 | Status: SHIPPED | OUTPATIENT
Start: 2022-03-14

## 2022-03-14 NOTE — PROGRESS NOTES
SUBJECTIVE:      Patient ID: Oliver Vargas is a 46 y.o. male.    Chief Complaint: Annual Exam    Mr Vargas is here today for his annual exam.  He is following with Dr Moore for elevated ferritin and is due for a f/u appt and labs. He is due for routine labs and colonoscopy. He is due for his diabetic eye exam. Says he had a few stressful family events and got behind on his health visits.     Diabetes  He presents for his follow-up diabetic visit. He has type 2 diabetes mellitus. No MedicAlert identification noted. His disease course has been stable. There are no hypoglycemic associated symptoms. Pertinent negatives for hypoglycemia include no confusion, dizziness, headaches, hunger, mood changes, nervousness/anxiousness, pallor, seizures, sleepiness, speech difficulty, sweats or tremors. Associated symptoms include weight loss. Pertinent negatives for diabetes include no blurred vision, no chest pain, no fatigue, no foot paresthesias, no foot ulcerations, no polydipsia, no polyphagia, no polyuria, no visual change and no weakness. There are no hypoglycemic complications. Pertinent negatives for hypoglycemia complications include no blackouts, no hospitalization, no nocturnal hypoglycemia, no required assistance and no required glucagon injection. Symptoms are improving. Pertinent negatives for diabetic complications include no autonomic neuropathy, CVA, heart disease, impotence, nephropathy, peripheral neuropathy, PVD or retinopathy. Risk factors for coronary artery disease include hypertension and diabetes mellitus. Current diabetic treatment includes diet and oral agent (dual therapy). He is compliant with treatment most of the time. His weight is stable. He is following a diabetic diet. Meal planning includes avoidance of concentrated sweets and carbohydrate counting. He participates in exercise daily. An ACE inhibitor/angiotensin II receptor blocker is being taken. He does not see a podiatrist.Eye  exam is current.   Hypertension  The current episode started more than 1 year ago. The problem is unchanged. The problem is controlled. Pertinent negatives include no blurred vision, chest pain, headaches, neck pain, palpitations, peripheral edema, shortness of breath or sweats. Risk factors for coronary artery disease include diabetes mellitus, dyslipidemia, obesity, male gender and family history. Past treatments include ACE inhibitors. The current treatment provides moderate improvement. There is no history of CVA, PVD or retinopathy.       History reviewed. No pertinent surgical history.  Family History   Problem Relation Age of Onset    Hypertension Mother     Diabetes type II Father     Heart disease Father       Social History     Socioeconomic History    Marital status:    Tobacco Use    Smoking status: Former Smoker     Packs/day: 0.50     Types: Cigarettes    Smokeless tobacco: Never Used   Substance and Sexual Activity    Alcohol use: Yes    Drug use: Never    Sexual activity: Yes     Current Outpatient Medications   Medication Sig Dispense Refill    HYDROcodone-acetaminophen (NORCO) 5-325 mg per tablet       ibuprofen (ADVIL,MOTRIN) 800 MG tablet       blood sugar diagnostic (FREESTYLE LITE STRIPS) Strp USE TO CHECK BLOOD GLUCOSE DAILY 100 each 3    blood-glucose meter kit To check BG one times daily, to use with insurance preferred meter 1 each 0    lancets (FREESTYLE LANCETS) 28 gauge Misc USE TO TEST BLOOD GLUCOSE ONCE DAILY 100 each 3    lisinopriL 10 MG tablet Take 1 tablet (10 mg total) by mouth once daily. 90 tablet 1    metFORMIN (GLUCOPHAGE-XR) 500 MG ER 24hr tablet Take 1 tablet (500 mg total) by mouth 2 (two) times daily with meals. 180 tablet 1     No current facility-administered medications for this visit.     Review of patient's allergies indicates:  No Known Allergies   Past Medical History:   Diagnosis Date    Hypertension      History reviewed. No pertinent  "surgical history.    Review of Systems   Constitutional: Positive for weight loss. Negative for activity change, appetite change, chills, diaphoresis, fatigue and unexpected weight change.   HENT: Negative for ear discharge, facial swelling, hearing loss, nosebleeds, rhinorrhea and trouble swallowing.    Eyes: Negative for blurred vision, photophobia, pain, discharge and visual disturbance.   Respiratory: Negative for apnea, choking, chest tightness, shortness of breath and wheezing.    Cardiovascular: Negative for chest pain and palpitations.   Gastrointestinal: Negative for abdominal pain, blood in stool, constipation, diarrhea and vomiting.   Endocrine: Negative for polydipsia, polyphagia and polyuria.   Genitourinary: Negative for difficulty urinating, hematuria, impotence and urgency.   Musculoskeletal: Negative for arthralgias, gait problem, joint swelling and neck pain.   Skin: Negative for pallor.   Neurological: Negative for dizziness, tremors, seizures, speech difficulty, weakness and headaches.   Hematological: Does not bruise/bleed easily.   Psychiatric/Behavioral: Negative for agitation, confusion and dysphoric mood. The patient is not nervous/anxious.       OBJECTIVE:      Vitals:    03/14/22 1314   BP: 110/80   Pulse: 92   Temp: 98.4 °F (36.9 °C)   SpO2: 99%   Weight: 84.8 kg (187 lb)   Height: 5' 9" (1.753 m)     Physical Exam  Vitals and nursing note reviewed.   Constitutional:       General: He is not in acute distress.     Appearance: He is well-developed.   HENT:      Head: Normocephalic and atraumatic.      Nose: Nose normal.      Mouth/Throat:      Pharynx: Uvula midline.   Eyes:      General: Lids are normal.      Conjunctiva/sclera: Conjunctivae normal.      Pupils: Pupils are equal, round, and reactive to light.      Right eye: Pupil is round and reactive.      Left eye: Pupil is round and reactive.   Neck:      Thyroid: No thyromegaly.      Vascular: No carotid bruit.   Cardiovascular:      " Rate and Rhythm: Normal rate and regular rhythm.      Pulses: Normal pulses.      Heart sounds: Normal heart sounds. No murmur heard.  Pulmonary:      Effort: Pulmonary effort is normal.      Breath sounds: Normal breath sounds. No wheezing or rhonchi.   Abdominal:      General: Bowel sounds are normal.      Palpations: Abdomen is soft. Abdomen is not rigid.      Tenderness: There is no abdominal tenderness.   Musculoskeletal:         General: Normal range of motion.      Cervical back: Normal range of motion and neck supple.      Right lower leg: No edema.      Left lower leg: No edema.   Lymphadenopathy:      Cervical: No cervical adenopathy.   Skin:     General: Skin is warm and dry.      Nails: There is no clubbing.   Neurological:      Mental Status: He is alert and oriented to person, place, and time.   Psychiatric:         Mood and Affect: Mood normal.         Speech: Speech normal.         Behavior: Behavior normal. Behavior is cooperative.         Thought Content: Thought content normal.        Assessment:       1. Preventative health care    2. Type 2 diabetes mellitus without complication, without long-term current use of insulin    3. Essential hypertension    4. Colon cancer screening        Plan:       Preventative health care  -     CBC Auto Differential; Future; Expected date: 03/28/2022  -     Comprehensive Metabolic Panel; Future; Expected date: 03/28/2022  -     Lipid Panel; Future; Expected date: 03/28/2022  -     TSH; Future; Expected date: 04/25/2022  -     Urinalysis; Future; Expected date: 03/28/2022  Counseled on age and gender appropriate medical preventative services, including cancer screenings, immunizations, overall nutritional health, need for a consistent exercise regimen and an overall push towards maintaining a vigorous and active lifestyle.       Type 2 diabetes mellitus without complication, without long-term current use of insulin  -     Hemoglobin A1C; Future; Expected date:  03/28/2022  -     Microalbumin/Creatinine Ratio, Urine; Future; Expected date: 03/15/2022  -     metFORMIN (GLUCOPHAGE-XR) 500 MG ER 24hr tablet; Take 1 tablet (500 mg total) by mouth 2 (two) times daily with meals.  Dispense: 180 tablet; Refill: 1    Essential hypertension  -     lisinopriL 10 MG tablet; Take 1 tablet (10 mg total) by mouth once daily.  Dispense: 90 tablet; Refill: 1    Colon cancer screening  -     Ambulatory referral/consult to Gastroenterology; Future; Expected date: 03/15/2022        Follow up in about 6 months (around 9/14/2022) for htn.      3/14/2022 KANDI Pham, FNP

## 2022-09-15 ENCOUNTER — OFFICE VISIT (OUTPATIENT)
Dept: FAMILY MEDICINE | Facility: CLINIC | Age: 47
End: 2022-09-15
Payer: OTHER GOVERNMENT

## 2022-09-15 VITALS
BODY MASS INDEX: 28.29 KG/M2 | HEART RATE: 92 BPM | OXYGEN SATURATION: 98 % | HEIGHT: 69 IN | DIASTOLIC BLOOD PRESSURE: 80 MMHG | WEIGHT: 191 LBS | SYSTOLIC BLOOD PRESSURE: 130 MMHG | TEMPERATURE: 99 F

## 2022-09-15 DIAGNOSIS — E11.9 TYPE 2 DIABETES MELLITUS WITHOUT COMPLICATION, WITHOUT LONG-TERM CURRENT USE OF INSULIN: Primary | ICD-10-CM

## 2022-09-15 DIAGNOSIS — I10 ESSENTIAL HYPERTENSION: ICD-10-CM

## 2022-09-15 DIAGNOSIS — Z12.11 SCREEN FOR COLON CANCER: ICD-10-CM

## 2022-09-15 DIAGNOSIS — E78.5 HYPERLIPIDEMIA, UNSPECIFIED HYPERLIPIDEMIA TYPE: ICD-10-CM

## 2022-09-15 DIAGNOSIS — Z00.00 PREVENTATIVE HEALTH CARE: ICD-10-CM

## 2022-09-15 PROCEDURE — 99214 OFFICE O/P EST MOD 30 MIN: CPT | Mod: S$GLB,,, | Performed by: NURSE PRACTITIONER

## 2022-09-15 PROCEDURE — 99214 PR OFFICE/OUTPT VISIT, EST, LEVL IV, 30-39 MIN: ICD-10-PCS | Mod: S$GLB,,, | Performed by: NURSE PRACTITIONER

## 2022-09-15 NOTE — PROGRESS NOTES
SUBJECTIVE:      Patient ID: Oliver Vargas is a 46 y.o. male.    Chief Complaint: Hypertension    Mr Vargas is here today to f/u on dm and htn.  He was following with Dr Moore for elevated ferritin and is due for a f/u appt. He is due for routine labs and colonoscopy. He is due for his diabetic eye exam. Says he has been working long hours and not kept up with his appointments.  He is checking his fasting glucose daily, average is 80-90    Diabetes  He presents for his follow-up diabetic visit. He has type 2 diabetes mellitus. No MedicAlert identification noted. His disease course has been stable. There are no hypoglycemic associated symptoms. Pertinent negatives for hypoglycemia include no confusion, dizziness, headaches, hunger, mood changes, nervousness/anxiousness, pallor, seizures, sleepiness, speech difficulty, sweats or tremors. Pertinent negatives for diabetes include no blurred vision, no chest pain, no fatigue, no foot paresthesias, no foot ulcerations, no polydipsia, no polyphagia, no polyuria, no visual change and no weakness. There are no hypoglycemic complications. Pertinent negatives for hypoglycemia complications include no blackouts, no hospitalization, no nocturnal hypoglycemia, no required assistance and no required glucagon injection. Symptoms are improving. There are no diabetic complications. Pertinent negatives for diabetic complications include no autonomic neuropathy, CVA, heart disease, impotence, nephropathy, peripheral neuropathy, PVD or retinopathy. Risk factors for coronary artery disease include hypertension and diabetes mellitus. Current diabetic treatment includes diet and oral agent (dual therapy). He is compliant with treatment most of the time. His weight is stable. He is following a diabetic diet. Meal planning includes avoidance of concentrated sweets and carbohydrate counting. He participates in exercise daily. An ACE inhibitor/angiotensin II receptor blocker is  being taken. He does not see a podiatrist.Eye exam is current.   Hypertension  The current episode started more than 1 year ago. The problem is unchanged. The problem is controlled. Pertinent negatives include no blurred vision, chest pain, headaches, neck pain, palpitations, peripheral edema, shortness of breath or sweats. Risk factors for coronary artery disease include diabetes mellitus, dyslipidemia, obesity, male gender and family history. Past treatments include ACE inhibitors. The current treatment provides moderate improvement. There is no history of CVA, PVD or retinopathy.     History reviewed. No pertinent surgical history.  Family History   Problem Relation Age of Onset    Hypertension Mother     Diabetes type II Father     Heart disease Father       Social History     Socioeconomic History    Marital status:    Tobacco Use    Smoking status: Former     Packs/day: 0.50     Types: Cigarettes    Smokeless tobacco: Never   Substance and Sexual Activity    Alcohol use: Yes    Drug use: Never    Sexual activity: Yes     Current Outpatient Medications   Medication Sig Dispense Refill    lisinopriL 10 MG tablet TAKE 1 TABLET DAILY 90 tablet 0    metFORMIN (GLUCOPHAGE-XR) 500 MG ER 24hr tablet Take 1 tablet (500 mg total) by mouth 2 (two) times daily with meals. 180 tablet 1    blood sugar diagnostic (FREESTYLE LITE STRIPS) Strp USE TO CHECK BLOOD GLUCOSE DAILY 100 each 3    blood-glucose meter kit To check BG one times daily, to use with insurance preferred meter 1 each 0    lancets (FREESTYLE LANCETS) 28 gauge Misc USE TO TEST BLOOD GLUCOSE ONCE DAILY 100 each 3     No current facility-administered medications for this visit.     Review of patient's allergies indicates:  No Known Allergies   Past Medical History:   Diagnosis Date    Hypertension      History reviewed. No pertinent surgical history.    Review of Systems   Constitutional:  Negative for appetite change, chills, diaphoresis, fatigue and  "unexpected weight change.   HENT:  Negative for ear discharge, facial swelling, hearing loss, nosebleeds and trouble swallowing.    Eyes:  Negative for blurred vision, photophobia, pain and visual disturbance.   Respiratory:  Negative for apnea, choking, shortness of breath and wheezing.    Cardiovascular:  Negative for chest pain and palpitations.   Gastrointestinal:  Negative for abdominal pain, blood in stool and vomiting.   Endocrine: Negative for polydipsia, polyphagia and polyuria.   Genitourinary:  Negative for difficulty urinating, hematuria and impotence.   Musculoskeletal:  Negative for gait problem, joint swelling and neck pain.   Skin:  Negative for pallor.   Neurological:  Negative for dizziness, tremors, seizures, speech difficulty, weakness, light-headedness and headaches.   Hematological:  Does not bruise/bleed easily.   Psychiatric/Behavioral:  Negative for agitation, confusion, dysphoric mood, self-injury, sleep disturbance and suicidal ideas. The patient is not nervous/anxious.     OBJECTIVE:      Vitals:    09/15/22 1323   BP: 130/80   Pulse: 92   Temp: 99 °F (37.2 °C)   SpO2: 98%   Weight: 86.6 kg (191 lb)   Height: 5' 9" (1.753 m)     Physical Exam  Vitals and nursing note reviewed.   Constitutional:       General: He is not in acute distress.     Appearance: He is well-developed.   HENT:      Head: Normocephalic and atraumatic.      Nose: Nose normal.      Mouth/Throat:      Pharynx: Uvula midline.   Eyes:      General: Lids are normal.      Conjunctiva/sclera: Conjunctivae normal.      Pupils: Pupils are equal, round, and reactive to light.      Right eye: Pupil is round and reactive.      Left eye: Pupil is round and reactive.   Neck:      Thyroid: No thyromegaly.      Vascular: No carotid bruit.   Cardiovascular:      Rate and Rhythm: Normal rate and regular rhythm.      Pulses: Normal pulses.      Heart sounds: Normal heart sounds. No murmur heard.  Pulmonary:      Effort: Pulmonary effort " is normal.      Breath sounds: Normal breath sounds. No wheezing, rhonchi or rales.   Abdominal:      General: Bowel sounds are normal.      Palpations: Abdomen is soft. Abdomen is not rigid.      Tenderness: There is no abdominal tenderness.   Musculoskeletal:         General: Normal range of motion.      Cervical back: Normal range of motion and neck supple.      Right lower leg: No edema.      Left lower leg: No edema.      Right foot: No deformity.      Left foot: No deformity.   Feet:      Right foot:      Protective Sensation: 10 sites tested.  10 sites sensed.      Skin integrity: No ulcer, blister or skin breakdown.      Left foot:      Protective Sensation: 10 sites tested.  10 sites sensed.      Skin integrity: No ulcer, blister or skin breakdown.   Lymphadenopathy:      Cervical: No cervical adenopathy.   Skin:     General: Skin is warm and dry.      Nails: There is no clubbing.   Neurological:      Mental Status: He is alert and oriented to person, place, and time.   Psychiatric:         Mood and Affect: Mood normal.         Speech: Speech normal.         Behavior: Behavior normal. Behavior is cooperative.         Thought Content: Thought content normal.         Judgment: Judgment normal.      Assessment:       1. Type 2 diabetes mellitus without complication, without long-term current use of insulin    2. Essential hypertension    3. Hyperlipidemia, unspecified hyperlipidemia type    4. Preventative health care    5. Screen for colon cancer        Plan:       Type 2 diabetes mellitus without complication, without long-term current use of insulin  -     Hemoglobin A1C; Future; Expected date: 09/29/2022  -     Microalbumin/Creatinine Ratio, Urine; Future; Expected date: 09/22/2022  -     Foot Exam Performed  Discussed with patient he needs to have labs to evaluate levels and need for medication. He says his home glucose reading have been good and he is feeling good. He will have labs in the next few  weeks.    Essential hypertension  Stable on current meds    Hyperlipidemia, unspecified hyperlipidemia type  Cont diet and exercise    Preventative health care  -     CBC Auto Differential; Future; Expected date: 09/29/2022  -     Comprehensive Metabolic Panel; Future; Expected date: 09/29/2022  -     Lipid Panel; Future; Expected date: 09/29/2022  -     TSH; Future; Expected date: 10/27/2022  -     Urinalysis; Future; Expected date: 09/29/2022    Screen for colon cancer  -     Ambulatory referral/consult to Gastroenterology; Future; Expected date: 09/22/2022      Follow up in about 6 months (around 3/15/2023) for DM.      9/15/2022 Dawit Ayon, KANDI, FNP

## 2022-09-21 ENCOUNTER — PATIENT MESSAGE (OUTPATIENT)
Dept: FAMILY MEDICINE | Facility: CLINIC | Age: 47
End: 2022-09-21

## 2022-09-21 LAB
ALBUMIN SERPL-MCNC: 4.8 G/DL (ref 4–5)
ALBUMIN/CREAT UR: <27 MG/G CREAT (ref 0–29)
ALBUMIN/GLOB SERPL: 2 {RATIO} (ref 1.2–2.2)
ALP SERPL-CCNC: 61 IU/L (ref 44–121)
ALT SERPL-CCNC: 25 IU/L (ref 0–44)
APPEARANCE UR: CLEAR
AST SERPL-CCNC: 23 IU/L (ref 0–40)
BASOPHILS # BLD AUTO: 0.1 X10E3/UL (ref 0–0.2)
BASOPHILS NFR BLD AUTO: 1 %
BILIRUB SERPL-MCNC: 0.6 MG/DL (ref 0–1.2)
BILIRUB UR QL STRIP: NEGATIVE
BUN SERPL-MCNC: 9 MG/DL (ref 6–24)
BUN/CREAT SERPL: 11 (ref 9–20)
CALCIUM SERPL-MCNC: 10 MG/DL (ref 8.7–10.2)
CHLORIDE SERPL-SCNC: 101 MMOL/L (ref 96–106)
CHOLEST SERPL-MCNC: 205 MG/DL (ref 100–199)
CO2 SERPL-SCNC: 19 MMOL/L (ref 20–29)
COLOR UR: YELLOW
CREAT SERPL-MCNC: 0.82 MG/DL (ref 0.76–1.27)
CREAT UR-MCNC: 11 MG/DL
EOSINOPHIL # BLD AUTO: 0.2 X10E3/UL (ref 0–0.4)
EOSINOPHIL NFR BLD AUTO: 2 %
ERYTHROCYTE [DISTWIDTH] IN BLOOD BY AUTOMATED COUNT: 12.9 % (ref 11.6–15.4)
EST. GFR  (NO RACE VARIABLE): 110 ML/MIN/1.73
GLOBULIN SER CALC-MCNC: 2.4 G/DL (ref 1.5–4.5)
GLUCOSE SERPL-MCNC: 82 MG/DL (ref 65–99)
GLUCOSE UR QL STRIP: NEGATIVE
HBA1C MFR BLD: 5.3 % (ref 4.8–5.6)
HCT VFR BLD AUTO: 46.4 % (ref 37.5–51)
HDLC SERPL-MCNC: 55 MG/DL
HGB BLD-MCNC: 15.8 G/DL (ref 13–17.7)
HGB UR QL STRIP: NEGATIVE
IMM GRANULOCYTES # BLD AUTO: 0 X10E3/UL (ref 0–0.1)
IMM GRANULOCYTES NFR BLD AUTO: 0 %
KETONES UR QL STRIP: NEGATIVE
LDLC SERPL CALC-MCNC: 137 MG/DL (ref 0–99)
LEUKOCYTE ESTERASE UR QL STRIP: NEGATIVE
LYMPHOCYTES # BLD AUTO: 3 X10E3/UL (ref 0.7–3.1)
LYMPHOCYTES NFR BLD AUTO: 33 %
MCH RBC QN AUTO: 32.6 PG (ref 26.6–33)
MCHC RBC AUTO-ENTMCNC: 34.1 G/DL (ref 31.5–35.7)
MCV RBC AUTO: 96 FL (ref 79–97)
MICRO URNS: NORMAL
MICROALBUMIN UR-MCNC: <3 UG/ML
MONOCYTES # BLD AUTO: 0.6 X10E3/UL (ref 0.1–0.9)
MONOCYTES NFR BLD AUTO: 7 %
NEUTROPHILS # BLD AUTO: 5.3 X10E3/UL (ref 1.4–7)
NEUTROPHILS NFR BLD AUTO: 57 %
NITRITE UR QL STRIP: NEGATIVE
PH UR STRIP: 6.5 [PH] (ref 5–7.5)
PLATELET # BLD AUTO: 246 X10E3/UL (ref 150–450)
POTASSIUM SERPL-SCNC: 4.2 MMOL/L (ref 3.5–5.2)
PROT SERPL-MCNC: 7.2 G/DL (ref 6–8.5)
PROT UR QL STRIP: NEGATIVE
RBC # BLD AUTO: 4.84 X10E6/UL (ref 4.14–5.8)
SODIUM SERPL-SCNC: 139 MMOL/L (ref 134–144)
SP GR UR STRIP: 1 (ref 1–1.03)
TRIGL SERPL-MCNC: 73 MG/DL (ref 0–149)
TSH SERPL DL<=0.005 MIU/L-ACNC: 1.33 UIU/ML (ref 0.45–4.5)
UROBILINOGEN UR STRIP-MCNC: 0.2 MG/DL (ref 0.2–1)
VLDLC SERPL CALC-MCNC: 13 MG/DL (ref 5–40)
WBC # BLD AUTO: 9.1 X10E3/UL (ref 3.4–10.8)

## 2022-11-03 ENCOUNTER — OFFICE VISIT (OUTPATIENT)
Dept: PULMONOLOGY | Facility: CLINIC | Age: 47
End: 2022-11-03
Payer: OTHER GOVERNMENT

## 2022-11-03 VITALS
WEIGHT: 193 LBS | DIASTOLIC BLOOD PRESSURE: 72 MMHG | SYSTOLIC BLOOD PRESSURE: 115 MMHG | OXYGEN SATURATION: 97 % | BODY MASS INDEX: 28.5 KG/M2 | HEART RATE: 81 BPM

## 2022-11-03 DIAGNOSIS — G47.33 OBSTRUCTIVE SLEEP APNEA SYNDROME: ICD-10-CM

## 2022-11-03 PROCEDURE — 99213 OFFICE O/P EST LOW 20 MIN: CPT | Mod: S$GLB,,, | Performed by: INTERNAL MEDICINE

## 2022-11-03 PROCEDURE — 99213 PR OFFICE/OUTPT VISIT, EST, LEVL III, 20-29 MIN: ICD-10-PCS | Mod: S$GLB,,, | Performed by: INTERNAL MEDICINE

## 2022-11-03 NOTE — PROGRESS NOTES
[facility]  Pulmonology  Follow-Up Clinic Visit    Subjective   Reason for Referral:    Oliver Vargas is a 47 y.o. male referred by his PCP for evaluation of ROSA.    Chief Complaint   Patient presents with    Follow-up     Follow up sleep apnea ; wants flu vaccine       6/30/2020:  Sleep Apnea  Diagnosed with ROSA in the past but was never able to obtain a CPAP.  He presents for a sleep evaluation. He complains of snoring, decreased memory, decreased concentration, decreased sexual drive, impotence, excessive daytime sleepiness, falling asleep while reading, eating, during conversation.  Symptoms began 6 years ago, gradually worsening since that time.  He goes to sleep at 9:30 pm weekdays and 10 pm weekends. He awakens 6:30 am weekdays and 7 on weekends. He falls asleep in 45 minutes.  Collar size 26. He denies noisy environment, uncomfortable room temperature, uncomfortable bedding. Previous evaluation and treatment has included PSG with C PAP titration.    STOP-Bang Questionnaire  Results:  Your score is 7 / 8    You have answered Yes to 2 or more of 4 STOP questions + BMI > 35kg/m2  Therefore, you are at High Risk for Obstructive Sleep Apnea (ROSA)     Smoking History:  He began smoking 25 years ago. He currently smokes less than a pack per day.  He has attempted to quit smoking in the past, most recently 6 months ago. Best success quitting using nicotine patch. Barriers to quitting include nothing    8/11/2020:  Doing well since our last visit.  Hasn't had a cigarette in a month.  Had PSG and CPAP titration done last week.    10/6/2020:  Got CPAP in early September.  Has quit smoking, drink less frequently and has lost 15 lbs.    11/01/2021:  Doing well since our last visit.  Using his CPAP on a regular basis without any issues.  Been able lose approximately 80 lb over the last year.  He has achieved this to watching his diet eliminating carbohydrates and exercising.  Still not smoking.    11/3/2022 -  Here for follow up, Patient is here for follow up visit for sleep apnea and therapy.  They report no issues with their machine.  They report good compliance with the therapy and feel that they are benefiting from it.  Discussed alternative therapies/options as appropriate.  Discussed diet, weight and exercise as appropriate.  All questions answered.     CPAP Therapy    CPAP therapy - 12    Date of sleep study - 7/2020 RDI - 33    Pt reports good compliance and benefit with the therapy.    Face to face visit with pt concerning their CPAP therapy.  I have stressed continued compliance with the treatment and all questions were answered.  Supply refills will be taken care of as needed.         Past Medical History:   Diagnosis Date    Hypertension      History reviewed. No pertinent surgical history.  Family History   Problem Relation Age of Onset    Hypertension Mother     Diabetes type II Father     Heart disease Father       Social History     Tobacco Use    Smoking status: Former     Packs/day: 0.50     Types: Cigarettes    Smokeless tobacco: Never   Substance and Sexual Activity    Alcohol use: Yes    Drug use: Never    Sexual activity: Yes      Allergies:  Patient has no known allergies.     Outpatient Medications as of 11/3/2022   Medication    blood sugar diagnostic (FREESTYLE LITE STRIPS) Strp    lancets (FREESTYLE LANCETS) 28 gauge Misc    lisinopriL 10 MG tablet    metFORMIN (GLUCOPHAGE-XR) 500 MG ER 24hr tablet    blood-glucose meter kit     No current facility-administered medications on file as of 11/3/2022.      Review of Systems   Constitutional:  Negative for fever, chills, weight loss, weight gain and night sweats.   HENT:  Negative for postnasal drip, voice change and congestion.    Eyes:  Negative for redness and itching.   Respiratory:  Negative for apnea, cough, sputum production, choking, wheezing and previous hospitialization due to pulmonary problems.    Cardiovascular:  Negative for chest pain  and palpitations.   Genitourinary:  Negative for difficulty urinating and hematuria.   Endocrine:  Negative for polydipsia, polyphagia and polyuria.    Musculoskeletal:  Negative for gait problem, joint swelling and myalgias.   Skin:  Negative for rash.   Gastrointestinal:  Negative for abdominal pain.   Neurological:  Negative for syncope, weakness and headaches.   Hematological:  Negative for adenopathy. Does not bruise/bleed easily and no excessive bruising.   Psychiatric/Behavioral:  Negative for confusion and sleep disturbance. The patient is not nervous/anxious.     Previous Reports Reviewed:   ER records, historical medical records, lab reports, nursing home notes, office notes, operative reports, radiology reports, referral letter/letters and x-ray reports   The following portions of the patient's history were reviewed and updated as appropriate: allergies, current medications, past family history, past medical history, past social history, past surgical history and problem list.    Objective:      /72 (BP Location: Left arm, Patient Position: Sitting, BP Method: Medium (Manual))   Pulse 81   Wt 87.5 kg (193 lb)   SpO2 97%   BMI 28.50 kg/m²   Body mass index is 28.5 kg/m².     Physical Exam   Constitutional: He is oriented to person, place, and time. He appears well-developed and well-nourished. No distress.   HENT:   Head: Normocephalic.   Right Ear: External ear normal.   Left Ear: External ear normal.   Mouth/Throat: Oropharynx is clear and moist. Mallampati Score: II.   Neck: No thyromegaly present.   Cardiovascular: Normal rate, regular rhythm, normal heart sounds and intact distal pulses. Exam reveals no gallop and no friction rub.   No murmur heard.  Pulmonary/Chest: Normal expansion, symmetric chest wall expansion and effort normal. He has no wheezes. He has no rhonchi. He has no rales.   Abdominal: Soft. Bowel sounds are normal. He exhibits no distension. There is no abdominal tenderness.    Musculoskeletal:         General: No tenderness, deformity or edema. Normal range of motion.      Cervical back: Normal range of motion and neck supple.   Lymphadenopathy: No supraclavicular adenopathy is present.     He has no cervical adenopathy.     He has no axillary adenopathy.   Neurological: He is alert and oriented to person, place, and time. No cranial nerve deficit.   Skin: Skin is warm and dry. No rash noted.   Psychiatric: He has a normal mood and affect.   Nursing note and vitals reviewed.   Personal Review of Relevant Diagnostic Studies:  I have personally reviewed and interpreted the following labs/studies/images.  PS2020:  PSG with AHI of 33  CPAP Titration   CPAP Recording:  Days:   (only 4 nights without since getting it)  Avg hours / night:  > 5  Avg AHI:  < 4    Assessment:       1. Obstructive sleep apnea syndrome          Impression:  ROSA on CPAP.    Plan:     Continue CPAP at 10 cm H2O.  Encouraged continued smoking cessation.  Continue to work on losing weight.  RTC 1 year      No follow-ups on file.    Orders Placed This Visit:  No orders of the defined types were placed in this encounter.      Jon Starks MD  [facility] Pulmonary/Critical Care  2022

## 2023-07-12 ENCOUNTER — PATIENT OUTREACH (OUTPATIENT)
Dept: ADMINISTRATIVE | Facility: HOSPITAL | Age: 48
End: 2023-07-12
Payer: OTHER GOVERNMENT

## 2023-07-12 NOTE — PROGRESS NOTES

## 2023-07-12 NOTE — LETTER
AUTHORIZATION FOR RELEASE OF   CONFIDENTIAL INFORMATION    Dear Dr. Melissa Saenz,      We are seeing Oliver Vargas, date of birth 1975, in the clinic at [facility] FAMILY / INTERNAL [facility]. Dawit Ayon NP is the patient's PCP. Oliver Vargas has an outstanding lab/procedure at the time we reviewed his chart. In order to help keep his health information updated, he has authorized us to request the following medical record(s):     ( x )  EYE EXAM -The most recent         Please fax records to [facility], Casey H Cox, NP, [phone]     If you have any questions, please contact       Tanya Sky  Nurse Clinical Care Coordinator  [facility]  Phone: [phone]  Fax: [phone]      Patient Name: Oliver Vargas  : 1975  Patient Phone #: [phone]                                          Oliver Vargas  MRN: [id number]  : 1975  Age: 46 y.o.  Sex: male       MEDICARE-PATIENTS CERTIFICATION, AUTHORIZATION TO RELEASE INFORMATION AND PAYMENT REQUEST:  I certify that the information given by me in applying under the Title XVII of Social Security Act is correct.  I authorize any quinonez of medical or other information about me to release to the Social Security Administration or its intermediaries or carriers any information needed for this or a related Medicare claim.  I request that payment of authorized benefits be made on my behalf to [facility] ([facility]).  I also acknowledge upon admission, that I received the Important Message from Medicare.     AUTHORIZATION TO PAY INSURANCE BENEFITS:  For and in consideration of medical services rendered to the patient named herein, I hereby assign and transfer to [facility], including but not limited to hospital based physicians, attending physicians, consulting physicians, nurse practitioners and physicians  assistants the rights for the payment of medical benefits which I may have under the policy/policies identified by me during registration or any policy which may be determined hereafter to pay benefits otherwise payable to me or to a beneficiary designated in the policy.  By this assignment, I authorize payment directly to [facility], hospital based physicians, attending physicians and consulting physicians of all medical benefits payable under the aforesaid policy/policies, but not to exceed the hospitals and/or clinic regular charges.     GUARANTEE OF ACCOUNT:  I/We certify that the information given is true and correct to the best of my/our knowledge.  I/We understand that bills are payable within thirty (30) days of the date of service.  If it becomes necessary for the account to be referred to an  or collection agency, the undersigned agrees to pay the reasonable s fees or collection expenses. I/We rhett permission and consent to [facility], our assignees, and third party collection agents to contact myself/us by any telephone number associated with myself/us, including wireless numbers and to leave answering machine and voicemail messages and include in any such messages, information required by law (including debt collection laws) and/or messages regarding amounts owed; to send text messages or emails using any email addresses I/we provided; to use pre-recorded/artificial voice messages  and/or an automatic dialing device in connection with any communications.   I/We agree to be responsible for the payment of all charges of this medical service and hospital based physicians, attending physicians and consulting physicians services rendered to the above named patient     COMMUNICATION AUTHORIZATION:   I hereby authorize Philip Briggs, to contact me on my cell phone and/or home phone using prerecorded messages, artificial voice messages, automatic telephone dialing devices or  other computer assisted technology, or by electronic mail, text messaging, or by any other form of electronic communication. This includes, but is not limited to, appointment reminders, yearly physical exam reminders, preventive care reminders, patient campaigns and welcome calls. I understand I have the right to opt out of these communications at any time.        Page 1 of 3                 CONSENT AND ACKNOWLEDGEMENT FORM CONTINUED     AUTHORIZATION TO RELEASE INFORMATION:  I hereby authorize [facility] and hospital based physicians to release the information for this occasion of service requested by my insurance company or third party payor for the purpose of obtaining payment for services rendered during this admission and/or to other healthcare providers for the purpose of follow-up care or evaluation of care.  This information may or may not include mental health and/or substance abuse information.     AUTHORIZATION FOR MEDICAL AND/OR SURGICAL TREATMENT:  I hereby authorize [facility] and its employees or agents to provide hospital care incident to this admission, including without limitations, consent to routine diagnostic procedures and medical treatment, which is to include whatever procedures that are deemed necessary by the admitting doctor and such other physicians or assistants as he may designate.     PERSONAL VALUABLES:      It is understood and agreed that the hospital maintains a safe for the safekeeping of money and valuables and the hospital shall not be liable for the loss of damage to any money, jewelry, glasses, documents, dentures, hearing aids or other articles of unusual value, unless placed therein, and shall not be liable for loss or damage to any other personal property, unless deposited with the hospital for safekeeping.  VALUABLES ARE NOT TO BE LEFT IN THE PATIENTS ROOM.     ADVANCE DIRECTIVES:  I understand that I am not required to have Advance Directives in order to be  treated.  I have received written information about my rights to formulate Advance Directives.     NOTICE OF PRIVACY PRACTICES/PATIENT RIGHTS/ADMISSION PACKET:  I acknowledge that I have received copies of the [facility] Notice of Privacy Practices, Patient Rights, and the Admission packet, which contains Smoking Cessation information. I understand that weapons, illegal drugs, or any other items considered  contraband, are not allowed on the [facility] campus, and that I do not have such items in my possession.        CONSENT TO PHOTOGRAPH AND/OR VIDEO TAPE DOCUMENTATION OF CARE:  I understand that photographs, videotapes, digital, or other images may be recorded to document my care.  I acknowledge that [facility] will retain the ownership rights to these photographs, videotapes, digital, or other images, and that I will be allowed access to view or obtain copies of any photographs, videotapes, digital, or other images created as part of the documentation of my care.  I understand that these images will be stored in a secure manner that will protect my privacy and that they will be kept for the time period required by law or by policy at [facility]. Images that identify me will be released and/or used outside the institution only upon written authorization from me or my legal representative (ELOISA, 2001).                                                                                                                                Page 2 of 3                    CONSENT AND ACKNOWLEDGEMENT FORM CONTINUED     [place] IMM[employer]IZATION NETWORK (LINKS) PARTICIPATION:  I acknowledge that I have been informed about [place] Immunization Network, or LINKS.  I understand that it is a means to keep track of my immunization records for myself, doctors offices, hospitals and other health care providers through secure, electronic means.     INSURANCE NETWORK ACKNOWLEDGEMENT:                                                                             I acknowledge that I have received notice, based on the information available at this time, regarding the status of my insurance plan as in or out of network at [facility]. I understand that a full listing of accepted insurance plans can be found at the [facility] website.     NOTICE     HEALTH CARE SERVICES MAY BE PROVIDED TO YOU AT A NETWORK HEALTH CARE FACILITY BY FACILITY-BASED PHYSICIANS WHO ARE NOT IN YOUR HEALTH PLAN.  YOU MAY BE RESPONSIBLE FOR PAYMENT OF ALL OR PART OF THE FEES FOR THOSE OUT-OF-NETWORK SERVICES, IN ADDITION TO APPLICABLE AMOUNTS DUE FOR CO-PAYMENTS, COINSURANCE, DEDUCTIBLES, AND NON-COVERED SERVICES.  SPECIFIC INFORMATION ABOUT IN-NETWORK AND OUT-OF NETWORK FACILITY-BASED PHYSICIANS CAN BE FOUND AT THE WEBSITE ADDRESS OF YOUR HEALTH PLAN OR BY CALLING THE [employer] TELEPHONE NUMBER OF YOUR HEALTH PLAN.        I/WE HAVE READ, UNDERSTAND AND AGREE TO THE ABOVE.        _______________________________   Patient/Legal Guardian Signature     This signature was collected at 09/15/2022     Time (if no electronic signature):____________     self       _______________________________   Printed Name/Relationship to Patient       Witness  Sign Here  _______________________________   Witness Signature     This signature was collected at 09/15/2022        _______________________________   Printed Name         Page 3 of 3

## 2023-07-14 ENCOUNTER — PATIENT OUTREACH (OUTPATIENT)
Dept: ADMINISTRATIVE | Facility: HOSPITAL | Age: 48
End: 2023-07-14
Payer: OTHER GOVERNMENT

## 2023-07-14 NOTE — PROGRESS NOTES
PATIENT'S LAST EYE EXAM WITH DR VASQUEZ WAS 6/29/20 AS OF 7/14/23. THAT EXAM HAS ALREADY BEEN HYPERLINKED IN .

## 2024-07-09 ENCOUNTER — PATIENT MESSAGE (OUTPATIENT)
Dept: ADMINISTRATIVE | Facility: HOSPITAL | Age: 49
End: 2024-07-09
Payer: OTHER GOVERNMENT

## 2025-01-15 ENCOUNTER — TELEPHONE (OUTPATIENT)
Dept: FAMILY MEDICINE | Facility: CLINIC | Age: 50
End: 2025-01-15
Payer: OTHER GOVERNMENT

## 2025-01-15 DIAGNOSIS — E78.5 HYPERLIPIDEMIA, UNSPECIFIED HYPERLIPIDEMIA TYPE: ICD-10-CM

## 2025-01-15 DIAGNOSIS — E11.9 TYPE 2 DIABETES MELLITUS WITHOUT COMPLICATION, WITH LONG-TERM CURRENT USE OF INSULIN: ICD-10-CM

## 2025-01-15 DIAGNOSIS — Z79.4 TYPE 2 DIABETES MELLITUS WITHOUT COMPLICATION, WITH LONG-TERM CURRENT USE OF INSULIN: ICD-10-CM

## 2025-01-15 DIAGNOSIS — I10 ESSENTIAL HYPERTENSION: Primary | ICD-10-CM

## 2025-01-16 ENCOUNTER — PATIENT MESSAGE (OUTPATIENT)
Dept: FAMILY MEDICINE | Facility: CLINIC | Age: 50
End: 2025-01-16
Payer: OTHER GOVERNMENT

## 2025-01-23 ENCOUNTER — OFFICE VISIT (OUTPATIENT)
Dept: FAMILY MEDICINE | Facility: CLINIC | Age: 50
End: 2025-01-23
Payer: OTHER GOVERNMENT

## 2025-01-23 VITALS
OXYGEN SATURATION: 95 % | WEIGHT: 202 LBS | DIASTOLIC BLOOD PRESSURE: 86 MMHG | BODY MASS INDEX: 29.92 KG/M2 | SYSTOLIC BLOOD PRESSURE: 136 MMHG | HEART RATE: 98 BPM | HEIGHT: 69 IN

## 2025-01-23 DIAGNOSIS — I10 ESSENTIAL HYPERTENSION: Primary | ICD-10-CM

## 2025-01-23 DIAGNOSIS — E11.9 TYPE 2 DIABETES MELLITUS WITHOUT COMPLICATION, WITHOUT LONG-TERM CURRENT USE OF INSULIN: ICD-10-CM

## 2025-01-23 DIAGNOSIS — E78.5 HYPERLIPIDEMIA, UNSPECIFIED HYPERLIPIDEMIA TYPE: ICD-10-CM

## 2025-01-23 DIAGNOSIS — G47.30 SLEEP APNEA, UNSPECIFIED TYPE: ICD-10-CM

## 2025-01-23 PROCEDURE — 99214 OFFICE O/P EST MOD 30 MIN: CPT | Mod: S$GLB,,, | Performed by: NURSE PRACTITIONER

## 2025-01-23 RX ORDER — INSULIN PUMP SYRINGE, 3 ML
EACH MISCELLANEOUS
Qty: 1 EACH | Refills: 0 | Status: SHIPPED | OUTPATIENT
Start: 2025-01-23 | End: 2026-01-23

## 2025-01-23 RX ORDER — LANCETS
EACH MISCELLANEOUS
Qty: 100 EACH | Refills: 1 | Status: SHIPPED | OUTPATIENT
Start: 2025-01-23

## 2025-01-23 RX ORDER — LISINOPRIL 10 MG/1
10 TABLET ORAL DAILY
Qty: 90 TABLET | Refills: 0 | Status: SHIPPED | OUTPATIENT
Start: 2025-01-23

## 2025-01-23 NOTE — PROGRESS NOTES
SUBJECTIVE:      Patient ID: Oliver Vargas is a 49 y.o. male.    Chief Complaint: Diabetes and Hypertension (Pt has not taken b/p or diabetic medication x 3 mths.)    Mr Vargas is here today to f/u on dm and htn. He has not been here in a few years. He was following with Dr Moore for elevated ferritin and is due for a f/u appt. Asking for a referral to pulmonology for f/u on sleep apnea       History of Present Illness    CHIEF COMPLAINT:  Oliver presents today for follow up.    SOCIAL HISTORY:  He has stopped drinking alcohol, denies current smoking, and exercises regularly.    SLEEP APNEA:  He continues to use CPAP machine for sleep apnea management.    CARDIOVASCULAR:  He denies chest pain. Family history is significant for grandmother who  of stroke.    GASTROINTESTINAL:  He reports frequent bowel movements. His last gastroenterology visit included a scope procedure.    LABS:  Blood glucose readings are consistently below 90, which he checks weekly.    MEDICATIONS:  He denies taking any prescribed medications.    FOLLOW UP CARE:  He has not followed up with hematologist Dr. Moore. He needs a referral to cardiology for CPAP supply maintenance.         History reviewed. No pertinent surgical history.  Family History   Problem Relation Name Age of Onset    Hypertension Mother      Diabetes type II Father      Heart disease Father        Social History     Socioeconomic History    Marital status:    Tobacco Use    Smoking status: Former     Current packs/day: 0.50     Types: Cigarettes    Smokeless tobacco: Never   Substance and Sexual Activity    Alcohol use: Yes    Drug use: Never    Sexual activity: Yes     Social Drivers of Health     Financial Resource Strain: Low Risk  (2025)    Overall Financial Resource Strain (CARDIA)     Difficulty of Paying Living Expenses: Not hard at all   Food Insecurity: No Food Insecurity (2025)    Hunger Vital Sign     Worried About Running Out of  Food in the Last Year: Never true     Ran Out of Food in the Last Year: Never true   Transportation Needs: No Transportation Needs (12/7/2020)    PRAPARE - Transportation     Lack of Transportation (Medical): No     Lack of Transportation (Non-Medical): No   Physical Activity: Sufficiently Active (1/23/2025)    Exercise Vital Sign     Days of Exercise per Week: 5 days     Minutes of Exercise per Session: 40 min   Stress: No Stress Concern Present (1/23/2025)    [employer] [place] [employer] - Occupational Stress Questionnaire     Feeling of Stress : Not at all   Housing Stability: Unknown (1/23/2025)    Housing Stability Vital Sign     Unable to Pay for Housing in the Last Year: No     Current Outpatient Medications   Medication Sig Dispense Refill    blood sugar diagnostic (FREESTYLE LITE STRIPS) Strp USE TO CHECK BLOOD GLUCOSE DAILY 100 each 3    blood sugar diagnostic Strp To check BG one times daily, to use with insurance preferred meter 100 each 0    blood-glucose meter kit To check BG one times daily, to use with insurance preferred meter 1 each 0    lancets (FREESTYLE LANCETS) 28 gauge Misc USE TO TEST BLOOD GLUCOSE ONCE DAILY 100 each 3    lancets Misc To check BG one times daily, to use with insurance preferred meter 100 each 1    lisinopriL 10 MG tablet TAKE 1 TABLET DAILY (Patient not taking: Reported on 1/23/2025) 90 tablet 0    lisinopriL 10 MG tablet Take 1 tablet (10 mg total) by mouth once daily. 90 tablet 0    metFORMIN (GLUCOPHAGE-XR) 500 MG ER 24hr tablet Take 1 tablet (500 mg total) by mouth 2 (two) times daily with meals. (Patient not taking: Reported on 1/23/2025) 180 tablet 1     No current facility-administered medications for this visit.     Review of patient's allergies indicates:  No Known Allergies   Past Medical History:   Diagnosis Date    Hypertension      History reviewed. No pertinent surgical history.    Review of Systems   Constitutional:  Negative for activity change,  "appetite change, chills, diaphoresis and unexpected weight change.   HENT:  Negative for ear discharge, facial swelling, hearing loss, nosebleeds, rhinorrhea and trouble swallowing.    Eyes:  Negative for photophobia, pain, discharge and visual disturbance.   Respiratory:  Negative for apnea, choking, chest tightness, shortness of breath and wheezing.    Cardiovascular:  Negative for chest pain and palpitations.   Gastrointestinal:  Negative for abdominal pain, blood in stool, constipation, diarrhea and vomiting.   Genitourinary:  Negative for difficulty urinating, hematuria and urgency.   Musculoskeletal:  Negative for arthralgias, gait problem and joint swelling.   Neurological:  Negative for dizziness, weakness, light-headedness, numbness and headaches.   Hematological:  Does not bruise/bleed easily.   Psychiatric/Behavioral:  Negative for agitation, dysphoric mood, self-injury, sleep disturbance and suicidal ideas. The patient is not nervous/anxious.       OBJECTIVE:      Vitals:    01/23/25 1254 01/23/25 1324   BP: (!) 140/88 136/86   Pulse: 98    SpO2: 95%    Weight: 91.6 kg (202 lb)    Height: 5' 9" (1.753 m)      Physical Exam  Vitals and nursing note reviewed.   Constitutional:       General: He is not in acute distress.     Appearance: He is well-developed.   HENT:      Head: Normocephalic and atraumatic.      Nose: Nose normal.      Mouth/Throat:      Pharynx: Uvula midline.   Eyes:      General: Lids are normal.      Conjunctiva/sclera: Conjunctivae normal.      Pupils: Pupils are equal, round, and reactive to light.      Right eye: Pupil is round and reactive.      Left eye: Pupil is round and reactive.   Neck:      Thyroid: No thyromegaly.      Vascular: No carotid bruit.   Cardiovascular:      Rate and Rhythm: Normal rate and regular rhythm.      Pulses: Normal pulses.      Heart sounds: Normal heart sounds. No murmur heard.  Pulmonary:      Effort: Pulmonary effort is normal.      Breath sounds: " Normal breath sounds. No wheezing, rhonchi or rales.   Abdominal:      General: Bowel sounds are normal.      Palpations: Abdomen is soft. Abdomen is not rigid.      Tenderness: There is no abdominal tenderness.   Musculoskeletal:         General: Normal range of motion.      Cervical back: Normal range of motion and neck supple.      Right lower leg: No edema.      Left lower leg: No edema.   Lymphadenopathy:      Cervical: No cervical adenopathy.   Skin:     General: Skin is warm and dry.      Nails: There is no clubbing.   Neurological:      Mental Status: He is alert and oriented to person, place, and time.   Psychiatric:         Mood and Affect: Mood normal.         Speech: Speech normal.         Behavior: Behavior normal. Behavior is cooperative.         Thought Content: Thought content normal.         Judgment: Judgment normal.       No visits with results within 1 Month(s) from this visit.   Latest known visit with results is:   Office Visit on 09/15/2022   Component Date Value Ref Range Status    WBC 09/20/2022 9.1  3.4 - 10.8 x10E3/uL Final    RBC 09/20/2022 4.84  4.14 - 5.80 x10E6/uL Final    Hemoglobin 09/20/2022 15.8  13.0 - 17.7 g/dL Final    Hematocrit 09/20/2022 46.4  37.5 - 51.0 % Final    MCV 09/20/2022 96  79 - 97 fL Final    MCH 09/20/2022 32.6  26.6 - 33.0 pg Final    MCHC 09/20/2022 34.1  31.5 - 35.7 g/dL Final    RDW 09/20/2022 12.9  11.6 - 15.4 % Final    Platelets 09/20/2022 246  150 - 450 x10E3/uL Final    Neutrophils 09/20/2022 57  Not Estab. % Final    Lymphs 09/20/2022 33  Not Estab. % Final    Monocytes 09/20/2022 7  Not Estab. % Final    Eos 09/20/2022 2  Not Estab. % Final    Basos 09/20/2022 1  Not Estab. % Final    Neutrophils (Absolute) 09/20/2022 5.3  1.4 - 7.0 x10E3/uL Final    Lymphs (Absolute) 09/20/2022 3.0  0.7 - 3.1 x10E3/uL Final    Monocytes(Absolute) 09/20/2022 0.6  0.1 - 0.9 x10E3/uL Final    Eos (Absolute) 09/20/2022 0.2  0.0 - 0.4 x10E3/uL Final    Baso (Absolute)  09/20/2022 0.1  0.0 - 0.2 x10E3/uL Final    Immature Granulocytes 09/20/2022 0  Not Estab. % Final    Immature Grans (Abs) 09/20/2022 0.0  0.0 - 0.1 x10E3/uL Final    Glucose 09/20/2022 82  65 - 99 mg/dL Final    Comment:                **Effective September 26, 2022 Glucose reference**                   interval will be changing to:                                                              70 - 99      BUN 09/20/2022 9  6 - 24 mg/dL Final    Creatinine 09/20/2022 0.82  0.76 - 1.27 mg/dL Final    eGFR 09/20/2022 110  >59 mL/min/1.73 Final    BUN/Creatinine Ratio 09/20/2022 11  9 - 20 Final    Sodium 09/20/2022 139  134 - 144 mmol/L Final    Potassium 09/20/2022 4.2  3.5 - 5.2 mmol/L Final    Chloride 09/20/2022 101  96 - 106 mmol/L Final    CO2 09/20/2022 19 (L)  20 - 29 mmol/L Final    Calcium 09/20/2022 10.0  8.7 - 10.2 mg/dL Final    Protein, Total 09/20/2022 7.2  6.0 - 8.5 g/dL Final    Albumin 09/20/2022 4.8  4.0 - 5.0 g/dL Final    Globulin, Total 09/20/2022 2.4  1.5 - 4.5 g/dL Final    Albumin/Globulin Ratio 09/20/2022 2.0  1.2 - 2.2 Final    Total Bilirubin 09/20/2022 0.6  0.0 - 1.2 mg/dL Final    Alkaline Phosphatase 09/20/2022 61  44 - 121 IU/L Final    AST 09/20/2022 23  0 - 40 IU/L Final    ALT 09/20/2022 25  0 - 44 IU/L Final    Cholesterol 09/20/2022 205 (H)  100 - 199 mg/dL Final    Triglycerides 09/20/2022 73  0 - 149 mg/dL Final    HDL 09/20/2022 55  >39 mg/dL Final    VLDL Cholesterol Ryan 09/20/2022 13  5 - 40 mg/dL Final    LDL Calculated 09/20/2022 137 (H)  0 - 99 mg/dL Final    TSH 09/20/2022 1.330  0.450 - 4.500 uIU/mL Final    Specific Gravity, UA 09/20/2022 1.005  1.005 - 1.030 Final    pH, UA 09/20/2022 6.5  5.0 - 7.5 Final    Color, UA 09/20/2022 Yellow  Yellow Final    Clarity, UA 09/20/2022 Clear  Clear Final    Leukocytes, UA 09/20/2022 Negative  Negative Final    Protein, UA 09/20/2022 Negative  Negative/Trace Final    Glucose, UA 09/20/2022 Negative  Negative Final    Ketones, UA  09/20/2022 Negative  Negative Final    Occult Blood UA 09/20/2022 Negative  Negative Final    Bilirubin, UA 09/20/2022 Negative  Negative Final    Urobilinogen, UA 09/20/2022 0.2  0.2 - 1.0 mg/dL Final    Nitrite, UA 09/20/2022 Negative  Negative Final    Microscopic Examination 09/20/2022 Comment   Final    Microscopic not indicated and not performed.    Hemoglobin A1c 09/20/2022 5.3  4.8 - 5.6 % Final    Comment:          Prediabetes: 5.7 - 6.4           Diabetes: >6.4           Glycemic control for adults with diabetes: <7.0      Creatinine, Urine 09/20/2022 11.0  Not Estab. mg/dL Final    Microalb, Ur 09/20/2022 <3.0  Not Estab. ug/mL Final    **Verified by repeat analysis**    Microalb/Crt. Ratio 09/20/2022 <27  0 - 29 mg/g creat Final    Comment:                        Normal:                0 -  29                         Moderately increased: 30 - 300                         Severely increased:       >300        Assessment:       1. Essential hypertension    2. Hyperlipidemia, unspecified hyperlipidemia type    3. Type 2 diabetes mellitus without complication, without long-term current use of insulin    4. Sleep apnea, unspecified type        Plan:       Essential hypertension  -     -     lisinopriL 10 MG tablet; Take 1 tablet (10 mg total) by mouth once daily.  Dispense: 90 tablet; Refill: 0    Hyperlipidemia, unspecified hyperlipidemia type  Will have labs previously ordered    Type 2 diabetes mellitus without complication, without long-term current use of insulin  -     blood-glucose meter kit; To check BG one times daily, to use with insurance preferred meter  Dispense: 1 each; Refill: 0  -     lancets Misc; To check BG one times daily, to use with insurance preferred meter  Dispense: 100 each; Refill: 1  -     blood sugar diagnostic Strp; To check BG one times daily, to use with insurance preferred meter  Dispense: 100 each; Refill: 0    Sleep apnea, unspecified type  Ambulatory referral/consult to  Pulmonology; Future; Expected date: 01/23/2025    Assessment & Plan    HYPERTENSION:  -- Evaluated the patient's current medication status and confirmed no blood pressure medication is being taken.  - Prescribed a low-dose blood pressure medication to manage the condition.  -  SLEEP APNEA:  - Confirmed the patient's continued use of CPAP machine for sleep apnea management.  - Referred the patient to Dr. Quintana for sleep apnea supplies and equipment maintenance.    HYPERLIPIDEMIA:  - Ordered labs, including a lipid panel, to assess the patient's cholesterol levels.      SMOKING CESSATION:  - Acknowledged the patient's successful smoking cessation.  - Confirmed the patient is currently not smoking.  - Encouraged the patient to maintain their smoke-free status and offered support resources if needed.    LIFESTYLE MODIFICATIONS:  - Oliver to continue exercising.  - Oliver to maintain smoking cessation and continue limiting alcohol consumption.  - Acknowledged patient's reduced alcohol consumption.    FOLLOW UP:  - Assessed the patient's family history of stroke as a risk factor for hypertension.         Follow up in about 6 weeks (around 3/6/2025) for htn .  This note was generated with the assistance of ambient listening technology. Verbal consent was obtained by the patient and accompanying visitor(s) for the recording of patient appointment to facilitate this note. I attest to having reviewed and edited the generated note for accuracy, though some syntax or spelling errors may persist. Please contact the author of this note for any clarification.        1/23/2025 KANDI Pham, WILLIAMP

## 2025-01-26 LAB
ALBUMIN SERPL-MCNC: 4.7 G/DL (ref 4.1–5.1)
ALBUMIN/CREAT UR: 6 MG/G CREAT (ref 0–29)
ALP SERPL-CCNC: 69 IU/L (ref 44–121)
ALT SERPL-CCNC: 33 IU/L (ref 0–44)
APPEARANCE UR: CLEAR
AST SERPL-CCNC: 24 IU/L (ref 0–40)
BACTERIA #/AREA URNS HPF: NORMAL /[HPF]
BASOPHILS # BLD AUTO: 0.1 X10E3/UL (ref 0–0.2)
BASOPHILS NFR BLD AUTO: 1 %
BILIRUB SERPL-MCNC: 1.3 MG/DL (ref 0–1.2)
BILIRUB UR QL STRIP: NEGATIVE
BUN SERPL-MCNC: 5 MG/DL (ref 6–24)
BUN/CREAT SERPL: 6 (ref 9–20)
CALCIUM SERPL-MCNC: 9.5 MG/DL (ref 8.7–10.2)
CASTS URNS QL MICRO: NORMAL /LPF
CHLORIDE SERPL-SCNC: 103 MMOL/L (ref 96–106)
CHOLEST SERPL-MCNC: 186 MG/DL (ref 100–199)
CO2 SERPL-SCNC: 23 MMOL/L (ref 20–29)
COLOR UR: YELLOW
CREAT SERPL-MCNC: 0.78 MG/DL (ref 0.76–1.27)
CREAT UR-MCNC: 64.5 MG/DL
EOSINOPHIL # BLD AUTO: 0.3 X10E3/UL (ref 0–0.4)
EOSINOPHIL NFR BLD AUTO: 3 %
EPI CELLS #/AREA URNS HPF: NORMAL /HPF (ref 0–10)
ERYTHROCYTE [DISTWIDTH] IN BLOOD BY AUTOMATED COUNT: 12.5 % (ref 11.6–15.4)
EST. GFR  (NO RACE VARIABLE): 110 ML/MIN/1.73
GLOBULIN SER CALC-MCNC: 2.4 G/DL (ref 1.5–4.5)
GLUCOSE SERPL-MCNC: 81 MG/DL (ref 70–99)
GLUCOSE UR QL STRIP: NEGATIVE
HBA1C MFR BLD: 5.8 % (ref 4.8–5.6)
HCT VFR BLD AUTO: 45.9 % (ref 37.5–51)
HDLC SERPL-MCNC: 45 MG/DL
HGB BLD-MCNC: 15.7 G/DL (ref 13–17.7)
HGB UR QL STRIP: NEGATIVE
IMM GRANULOCYTES # BLD AUTO: 0.1 X10E3/UL (ref 0–0.1)
IMM GRANULOCYTES NFR BLD AUTO: 1 %
KETONES UR QL STRIP: NEGATIVE
LDLC SERPL CALC-MCNC: 118 MG/DL (ref 0–99)
LEUKOCYTE ESTERASE UR QL STRIP: NEGATIVE
LYMPHOCYTES # BLD AUTO: 3.1 X10E3/UL (ref 0.7–3.1)
LYMPHOCYTES NFR BLD AUTO: 25 %
MCH RBC QN AUTO: 33.8 PG (ref 26.6–33)
MCHC RBC AUTO-ENTMCNC: 34.2 G/DL (ref 31.5–35.7)
MCV RBC AUTO: 99 FL (ref 79–97)
MICRO URNS: NORMAL
MICRO URNS: NORMAL
MICROALBUMIN UR-MCNC: 3.9 UG/ML
MONOCYTES # BLD AUTO: 0.9 X10E3/UL (ref 0.1–0.9)
MONOCYTES NFR BLD AUTO: 8 %
NEUTROPHILS # BLD AUTO: 7.7 X10E3/UL (ref 1.4–7)
NEUTROPHILS NFR BLD AUTO: 62 %
NITRITE UR QL STRIP: NEGATIVE
PH UR STRIP: 7.5 [PH] (ref 5–7.5)
PLATELET # BLD AUTO: 251 X10E3/UL (ref 150–450)
POTASSIUM SERPL-SCNC: 3.8 MMOL/L (ref 3.5–5.2)
PROT SERPL-MCNC: 7.1 G/DL (ref 6–8.5)
PROT UR QL STRIP: NEGATIVE
RBC # BLD AUTO: 4.65 X10E6/UL (ref 4.14–5.8)
RBC #/AREA URNS HPF: NORMAL /HPF (ref 0–2)
SODIUM SERPL-SCNC: 141 MMOL/L (ref 134–144)
SP GR UR STRIP: 1.01 (ref 1–1.03)
TRIGL SERPL-MCNC: 127 MG/DL (ref 0–149)
TSH SERPL DL<=0.005 MIU/L-ACNC: 1.85 UIU/ML (ref 0.45–4.5)
URINALYSIS REFLEX: NORMAL
UROBILINOGEN UR STRIP-MCNC: 0.2 MG/DL (ref 0.2–1)
VLDLC SERPL CALC-MCNC: 23 MG/DL (ref 5–40)
WBC # BLD AUTO: 12.1 X10E3/UL (ref 3.4–10.8)
WBC #/AREA URNS HPF: NORMAL /HPF (ref 0–5)

## 2025-01-27 ENCOUNTER — PATIENT MESSAGE (OUTPATIENT)
Dept: FAMILY MEDICINE | Facility: CLINIC | Age: 50
End: 2025-01-27
Payer: OTHER GOVERNMENT

## 2025-01-27 ENCOUNTER — TELEPHONE (OUTPATIENT)
Dept: FAMILY MEDICINE | Facility: CLINIC | Age: 50
End: 2025-01-27
Payer: OTHER GOVERNMENT

## 2025-01-28 DIAGNOSIS — I10 ESSENTIAL HYPERTENSION: ICD-10-CM

## 2025-01-28 DIAGNOSIS — E78.5 HYPERLIPIDEMIA, UNSPECIFIED HYPERLIPIDEMIA TYPE: ICD-10-CM

## 2025-01-28 DIAGNOSIS — E11.9 TYPE 2 DIABETES MELLITUS WITHOUT COMPLICATION, WITHOUT LONG-TERM CURRENT USE OF INSULIN: ICD-10-CM

## 2025-01-28 DIAGNOSIS — Z79.899 ENCOUNTER FOR LONG-TERM (CURRENT) USE OF MEDICATIONS: Primary | ICD-10-CM

## 2025-01-28 NOTE — TELEPHONE ENCOUNTER
Had been fighting bad head cold, just getting over it, so that was probably why WBC up, been on good diet so agreed to Lipitor. Will set reminder and future labs to lab leo to be done before the March appt

## 2025-01-28 NOTE — TELEPHONE ENCOUNTER
----- Message from Dawit Ayon NP sent at 1/27/2025 12:15 PM CST -----  WBC elevated, any signs of infection? Cough or cold, sinus?   A1c is well controlled at 5.8. I would recommend low dose lipitor for better lipid control. All other labs ok. We can repeat labs prior to his next appt

## 2025-01-29 RX ORDER — ATORVASTATIN CALCIUM 10 MG/1
10 TABLET, FILM COATED ORAL DAILY
Qty: 90 TABLET | Refills: 0 | Status: SHIPPED | OUTPATIENT
Start: 2025-01-29 | End: 2026-01-29

## 2025-03-05 ENCOUNTER — PATIENT MESSAGE (OUTPATIENT)
Dept: FAMILY MEDICINE | Facility: CLINIC | Age: 50
End: 2025-03-05
Payer: OTHER GOVERNMENT

## 2025-03-13 ENCOUNTER — OFFICE VISIT (OUTPATIENT)
Dept: FAMILY MEDICINE | Facility: CLINIC | Age: 50
End: 2025-03-13
Payer: OTHER GOVERNMENT

## 2025-03-13 ENCOUNTER — RESULTS FOLLOW-UP (OUTPATIENT)
Dept: FAMILY MEDICINE | Facility: CLINIC | Age: 50
End: 2025-03-13

## 2025-03-13 ENCOUNTER — OFFICE VISIT (OUTPATIENT)
Dept: PULMONOLOGY | Facility: CLINIC | Age: 50
End: 2025-03-13
Payer: OTHER GOVERNMENT

## 2025-03-13 VITALS
WEIGHT: 197 LBS | SYSTOLIC BLOOD PRESSURE: 136 MMHG | BODY MASS INDEX: 29.09 KG/M2 | DIASTOLIC BLOOD PRESSURE: 90 MMHG | HEART RATE: 88 BPM | OXYGEN SATURATION: 99 %

## 2025-03-13 VITALS — HEIGHT: 69 IN | WEIGHT: 198 LBS | HEART RATE: 104 BPM | OXYGEN SATURATION: 98 % | BODY MASS INDEX: 29.33 KG/M2

## 2025-03-13 DIAGNOSIS — I10 ESSENTIAL HYPERTENSION: ICD-10-CM

## 2025-03-13 DIAGNOSIS — Z00.00 PREVENTATIVE HEALTH CARE: Primary | ICD-10-CM

## 2025-03-13 DIAGNOSIS — G47.33 OBSTRUCTIVE SLEEP APNEA SYNDROME: Primary | ICD-10-CM

## 2025-03-13 DIAGNOSIS — E78.5 HYPERLIPIDEMIA, UNSPECIFIED HYPERLIPIDEMIA TYPE: ICD-10-CM

## 2025-03-13 DIAGNOSIS — Z79.899 ENCOUNTER FOR LONG-TERM (CURRENT) USE OF MEDICATIONS: ICD-10-CM

## 2025-03-13 DIAGNOSIS — K62.89 RECTAL PAIN: ICD-10-CM

## 2025-03-13 PROCEDURE — 99213 OFFICE O/P EST LOW 20 MIN: CPT | Mod: S$GLB,,, | Performed by: INTERNAL MEDICINE

## 2025-03-13 RX ORDER — LISINOPRIL 10 MG/1
10 TABLET ORAL DAILY
Qty: 90 TABLET | Refills: 1 | Status: SHIPPED | OUTPATIENT
Start: 2025-03-13

## 2025-03-13 RX ORDER — ATORVASTATIN CALCIUM 10 MG/1
10 TABLET, FILM COATED ORAL DAILY
Qty: 90 TABLET | Refills: 1 | Status: SHIPPED | OUTPATIENT
Start: 2025-03-13 | End: 2026-03-13

## 2025-03-13 RX ORDER — HYDROCORTISONE ACETATE 25 MG/1
25 SUPPOSITORY RECTAL 2 TIMES DAILY
Qty: 20 SUPPOSITORY | Refills: 0 | Status: SHIPPED | OUTPATIENT
Start: 2025-03-13 | End: 2025-03-23

## 2025-03-13 NOTE — PROGRESS NOTES
SUBJECTIVE:      Patient ID: Oliver Vargas is a 49 y.o. male.    Chief Complaint: Hypertension    HPI  History of Present Illness    CHIEF COMPLAINT:  Oliver presents today for an annual exam    HEMORRHOIDS:  He reports new onset of severe hemorrhoids starting 2-3 months ago with blood on toilet paper but not in stool. The condition is sometimes painful. He denies any prior history of hemorrhoids or constipation. He has been using OTC hemorrhoid preparations for relief.    MEDICATIONS:  He is taking Atorvastatin for cholesterol management, low dose Lisinopril for htn. He tolerates all medications well without side effects.    SOCIAL HISTORY:  He reports occasional smoking.    LABS:  LDL cholesterol improved from 118 to 78. Blood sugar was 5.8. White blood cell count improved from previous flu. Liver enzymes and kidney function were normal.         History reviewed. No pertinent surgical history.  Family History   Problem Relation Name Age of Onset    Hypertension Mother      Diabetes type II Father      Heart disease Father        Social History     Socioeconomic History    Marital status:    Tobacco Use    Smoking status: Former     Current packs/day: 0.00     Average packs/day: 1 pack/day for 26.0 years (26.0 ttl pk-yrs)     Types: Cigarettes     Start date: 1996     Quit date: 2022     Years since quitting: 3.1    Smokeless tobacco: Never   Substance and Sexual Activity    Alcohol use: Yes    Drug use: Never    Sexual activity: Yes     Social Drivers of Health     Financial Resource Strain: Low Risk  (3/13/2025)    Overall Financial Resource Strain (CARDIA)     Difficulty of Paying Living Expenses: Not hard at all   Food Insecurity: No Food Insecurity (3/13/2025)    Hunger Vital Sign     Worried About Running Out of Food in the Last Year: Never true     Ran Out of Food in the Last Year: Never true   Transportation Needs: No Transportation Needs (3/13/2025)    PRAPARE - Transportation     Lack of  Transportation (Medical): No     Lack of Transportation (Non-Medical): No   Physical Activity: Sufficiently Active (3/13/2025)    Exercise Vital Sign     Days of Exercise per Week: 5 days     Minutes of Exercise per Session: 60 min   Stress: No Stress Concern Present (3/13/2025)    [employer] - Occupational Stress Questionnaire     Feeling of Stress : Only a little   Housing Stability: Low Risk  (3/13/2025)    Housing Stability Vital Sign     Unable to Pay for Housing in the Last Year: No     Number of Times Moved in the Last Year: 0     Homeless in the Last Year: No     Current Medications[1]  Review of patient's allergies indicates:  No Known Allergies   Past Medical History:   Diagnosis Date    Hypertension      History reviewed. No pertinent surgical history.    Review of Systems   Constitutional:  Negative for activity change, appetite change, chills, diaphoresis and unexpected weight change.   HENT:  Negative for ear discharge, facial swelling, hearing loss, nosebleeds, rhinorrhea and trouble swallowing.    Eyes:  Negative for photophobia, pain, discharge and visual disturbance.   Respiratory:  Negative for apnea, choking, chest tightness, shortness of breath and wheezing.    Cardiovascular:  Negative for chest pain and palpitations.   Gastrointestinal:  Positive for rectal pain. Negative for abdominal pain, blood in stool, constipation, diarrhea and vomiting.   Endocrine: Negative for polydipsia, polyphagia and polyuria.   Genitourinary:  Negative for difficulty urinating, hematuria and urgency.   Musculoskeletal:  Negative for arthralgias, gait problem, joint swelling and neck pain.   Skin:  Negative for pallor.   Neurological:  Negative for seizures, speech difficulty, weakness and headaches.   Hematological:  Does not bruise/bleed easily.   Psychiatric/Behavioral:  Negative for agitation, confusion and dysphoric mood.       OBJECTIVE:      Vitals:    03/13/25 1434   BP: (P)  "120/60   Pulse: 104   SpO2: 98%   Weight: 89.8 kg (198 lb)   Height: 5' 9" (1.753 m)     Physical Exam  Vitals and nursing note reviewed.   Constitutional:       General: He is not in acute distress.     Appearance: He is well-developed.   HENT:      Head: Normocephalic and atraumatic.      Nose: Nose normal.      Mouth/Throat:      Pharynx: Uvula midline.   Eyes:      General: Lids are normal.      Conjunctiva/sclera: Conjunctivae normal.      Pupils: Pupils are equal, round, and reactive to light.      Right eye: Pupil is round and reactive.      Left eye: Pupil is round and reactive.   Neck:      Thyroid: No thyromegaly.      Vascular: No carotid bruit.   Cardiovascular:      Rate and Rhythm: Normal rate and regular rhythm.      Pulses: Normal pulses.      Heart sounds: Normal heart sounds.   Pulmonary:      Effort: Pulmonary effort is normal.      Breath sounds: Normal breath sounds. No wheezing, rhonchi or rales.   Abdominal:      General: Bowel sounds are normal.      Palpations: Abdomen is soft. Abdomen is not rigid.      Tenderness: There is no abdominal tenderness.   Genitourinary:     Rectum: Tenderness present. No mass, anal fissure or external hemorrhoid.   Musculoskeletal:         General: Normal range of motion.      Cervical back: Normal range of motion and neck supple.      Right lower leg: No edema.      Left lower leg: No edema.   Lymphadenopathy:      Cervical: No cervical adenopathy.   Skin:     General: Skin is warm and dry.      Nails: There is no clubbing.   Neurological:      Mental Status: He is alert and oriented to person, place, and time.   Psychiatric:         Mood and Affect: Mood normal.         Speech: Speech normal.         Behavior: Behavior is cooperative.         Thought Content: Thought content normal.         Judgment: Judgment normal.       No visits with results within 1 Month(s) from this visit.   Latest known visit with results is:   Refill on 01/28/2025   Component Date " Value Ref Range Status    Glucose 03/11/2025 88  70 - 99 mg/dL Final    BUN 03/11/2025 6  6 - 24 mg/dL Final    Creatinine 03/11/2025 0.73 (L)  0.76 - 1.27 mg/dL Final    eGFR 03/11/2025 112  >59 mL/min/1.73 Final    BUN/Creatinine Ratio 03/11/2025 8 (L)  9 - 20 Final    Sodium 03/11/2025 142  134 - 144 mmol/L Final    Potassium 03/11/2025 4.1  3.5 - 5.2 mmol/L Final    Chloride 03/11/2025 104  96 - 106 mmol/L Final    CO2 03/11/2025 18 (L)  20 - 29 mmol/L Final    Calcium 03/11/2025 9.6  8.7 - 10.2 mg/dL Final    Protein, Total 03/11/2025 7.4  6.0 - 8.5 g/dL Final    Albumin 03/11/2025 4.9  4.1 - 5.1 g/dL Final    Globulin, Total 03/11/2025 2.5  1.5 - 4.5 g/dL Final    Total Bilirubin 03/11/2025 0.8  0.0 - 1.2 mg/dL Final    Alkaline Phosphatase 03/11/2025 73  44 - 121 IU/L Final    AST 03/11/2025 27  0 - 40 IU/L Final    ALT 03/11/2025 36  0 - 44 IU/L Final    WBC 03/11/2025 10.2  3.4 - 10.8 x10E3/uL Final    RBC 03/11/2025 4.73  4.14 - 5.80 x10E6/uL Final    Hemoglobin 03/11/2025 15.9  13.0 - 17.7 g/dL Final    Hematocrit 03/11/2025 46.2  37.5 - 51.0 % Final    MCV 03/11/2025 98 (H)  79 - 97 fL Final    MCH 03/11/2025 33.6 (H)  26.6 - 33.0 pg Final    MCHC 03/11/2025 34.4  31.5 - 35.7 g/dL Final    RDW 03/11/2025 12.1  11.6 - 15.4 % Final    Platelets 03/11/2025 271  150 - 450 x10E3/uL Final    Neutrophils 03/11/2025 59  Not Estab. % Final    Lymphs 03/11/2025 31  Not Estab. % Final    Monocytes 03/11/2025 7  Not Estab. % Final    Eos 03/11/2025 2  Not Estab. % Final    Basos 03/11/2025 1  Not Estab. % Final    Neutrophils (Absolute) 03/11/2025 6.1  1.4 - 7.0 x10E3/uL Final    Lymphs (Absolute) 03/11/2025 3.2 (H)  0.7 - 3.1 x10E3/uL Final    Monocytes(Absolute) 03/11/2025 0.7  0.1 - 0.9 x10E3/uL Final    Eos (Absolute) 03/11/2025 0.2  0.0 - 0.4 x10E3/uL Final    Baso (Absolute) 03/11/2025 0.1  0.0 - 0.2 x10E3/uL Final    Immature Granulocytes 03/11/2025 0  Not Estab. % Final    Immature Grans (Abs) 03/11/2025 0.0   0.0 - 0.1 x10E3/uL Final    Cholesterol 03/11/2025 143  100 - 199 mg/dL Final    Triglycerides 03/11/2025 87  0 - 149 mg/dL Final    HDL 03/11/2025 48  >39 mg/dL Final    VLDL Cholesterol Ryan 03/11/2025 17  5 - 40 mg/dL Final    LDL Calculated 03/11/2025 78  0 - 99 mg/dL Final      Assessment:       1. Preventative health care    2. Hyperlipidemia, unspecified hyperlipidemia type    3. Essential hypertension    4. Rectal pain    5. Encounter for long-term (current) use of medications        Plan:       Preventative health care  Counseled on age and gender appropriate medical preventative services, including cancer screenings, immunizations, overall nutritional health, need for a consistent exercise regimen and an overall push towards maintaining a vigorous and active lifestyle.     Hyperlipidemia, unspecified hyperlipidemia type  -     atorvastatin (LIPITOR) 10 MG tablet; Take 1 tablet (10 mg total) by mouth once daily.  Dispense: 90 tablet; Refill: 1    Essential hypertension  -     atorvastatin (LIPITOR) 10 MG tablet; Take 1 tablet (10 mg total) by mouth once daily.  Dispense: 90 tablet; Refill: 1  -     lisinopriL 10 MG tablet; Take 1 tablet (10 mg total) by mouth once daily.  Dispense: 90 tablet; Refill: 1    Rectal pain  -     Ambulatory referral/consult to Gastroenterology; Future; Expected date: 03/13/2025  -     hydrocortisone (ANUSOL-HC) 25 mg suppository; Place 1 suppository (25 mg total) rectally 2 (two) times daily. for 10 days  Dispense: 20 suppository; Refill: 0    Encounter for long-term (current) use of medications  -     atorvastatin (LIPITOR) 10 MG tablet; Take 1 tablet (10 mg total) by mouth once daily.  Dispense: 90 tablet; Refill: 1      Assessment & Plan    DIABETES MANAGEMENT:  -- Instructed the patient to maintain current diabetes management regimen.    HYPERTENSION MANAGEMENT:  - Continued Lisinopril at current very low dose for hypertension management.  - Advised patient to monitor blood  pressure regularly and report any significant changes.    HYPERLIPIDEMIA MANAGEMENT:  - Evaluated LDL cholesterol, which reduced from 118 to 78 mg/dL, indicating effectiveness of current regimen.  - Reviewed cholesterol levels and liver enzymes.  - Continued Atorvastatin at current low dose for cholesterol management.  - Encouraged patient to maintain healthy diet and exercise routine to support cholesterol management.    HEMORRHOID MANAGEMENT:  - Evaluated hemorrhoid symptoms; no obvious external hemorrhoids observed on physical exam.  - Noted patient reports symptoms of hemorrhoids for about 2 months, including bleeding and pain.  - Explained potential causes of rectal discomfort  - Recommend warm sitz baths to alleviate rectal discomfort.  - Prescribed rectal suppositories for hemorrhoid symptoms.  - Referred patient to gastroenterologist Dr. Jerry for potential sigmoidoscopy if rectal symptoms persist.  - Advised follow up in 2 weeks if symptoms do not improve with current treatment.    TOBACCO USE:  - Noted patient reports occasional smoking.  - Counseled patient on the health risks associated with tobacco use and the benefits of quitting.  - Offered resources for smoking cessation if patient expresses interest in quitting.          Follow up in about 6 months (around 9/13/2025) for htn .  This note was generated with the assistance of ambient listening technology. Verbal consent was obtained by the patient and accompanying visitor(s) for the recording of patient appointment to facilitate this note. I attest to having reviewed and edited the generated note for accuracy, though some syntax or spelling errors may persist. Please contact the author of this note for any clarification.        3/13/2025 KANDI Pham, WILLIAMP             [1]   Current Outpatient Medications   Medication Sig Dispense Refill    atorvastatin (LIPITOR) 10 MG tablet Take 1 tablet (10 mg total) by mouth once daily. 90 tablet 1    blood  sugar diagnostic (FREESTYLE LITE STRIPS) Strp USE TO CHECK BLOOD GLUCOSE DAILY 100 each 3    blood sugar diagnostic Strp To check BG one times daily, to use with insurance preferred meter 100 each 0    blood-glucose meter kit To check BG one times daily, to use with insurance preferred meter 1 each 0    hydrocortisone (ANUSOL-HC) 25 mg suppository Place 1 suppository (25 mg total) rectally 2 (two) times daily. for 10 days 20 suppository 0    lancets (FREESTYLE LANCETS) 28 gauge Misc USE TO TEST BLOOD GLUCOSE ONCE DAILY 100 each 3    lancets Misc To check BG one times daily, to use with insurance preferred meter 100 each 1    lisinopriL 10 MG tablet Take 1 tablet (10 mg total) by mouth once daily. 90 tablet 1     No current facility-administered medications for this visit.

## 2025-03-13 NOTE — PROGRESS NOTES
[facility]  Pulmonology  Follow-Up Clinic Visit    Subjective   Reason for Referral:    Oliver Vargas is a 49 y.o. male referred by his PCP for evaluation of ROSA.    Chief Complaint   Patient presents with    Follow-up    Sleep Apnea     New cpap supplies      6/30/2020:  Sleep Apnea  Diagnosed with ROSA in the past but was never able to obtain a CPAP.  He presents for a sleep evaluation. He complains of snoring, decreased memory, decreased concentration, decreased sexual drive, impotence, excessive daytime sleepiness, falling asleep while reading, eating, during conversation.  Symptoms began 6 years ago, gradually worsening since that time.  He goes to sleep at 9:30 pm weekdays and 10 pm weekends. He awakens 6:30 am weekdays and 7 on weekends. He falls asleep in 45 minutes.  Collar size 26. He denies noisy environment, uncomfortable room temperature, uncomfortable bedding. Previous evaluation and treatment has included PSG with C PAP titration.    STOP-Bang Questionnaire  Results:  Your score is 7 / 8    You have answered Yes to 2 or more of 4 STOP questions + BMI > 35kg/m2  Therefore, you are at High Risk for Obstructive Sleep Apnea (ROSA)     Smoking History:  He began smoking 25 years ago. He currently smokes less than a pack per day.  He has attempted to quit smoking in the past, most recently 6 months ago. Best success quitting using nicotine patch. Barriers to quitting include nothing    8/11/2020:  Doing well since our last visit.  Hasn't had a cigarette in a month.  Had PSG and CPAP titration done last week.    10/6/2020:  Got CPAP in early September.  Has quit smoking, drink less frequently and has lost 15 lbs.    11/01/2021:  Doing well since our last visit.  Using his CPAP on a regular basis without any issues.  Been able lose approximately 80 lb over the last year.  He has achieved this to watching his diet eliminating carbohydrates and exercising.  Still not smoking.    11/3/2022 - Here for  follow up, Patient is here for follow up visit for sleep apnea and therapy.  They report no issues with their machine.  They report good compliance with the therapy and feel that they are benefiting from it.  Discussed alternative therapies/options as appropriate.  Discussed diet, weight and exercise as appropriate.  All questions answered.     3/13/2025 - Here for follow up visit. Patient is being seen for sleep apnea and therapy.  They report no issues with their machine.  They report good compliance with the therapy and feel that they are benefiting from it.  Discussed alternative therapies/options as appropriate.  Discussed diet, weight and exercise as appropriate.  All questions answered. Have reviewed compliance report if available, if report is not available at the time of the visit we have requested the report from the DME and will review when available.  Has FFM.  No other issues or problems  He smokes occasionally/rarely             Past Medical History:   Diagnosis Date    Hypertension      No past surgical history on file.  Family History   Problem Relation Name Age of Onset    Hypertension Mother      Diabetes type II Father      Heart disease Father        Social History     Tobacco Use    Smoking status: Former     Current packs/day: 0.50     Types: Cigarettes    Smokeless tobacco: Never   Substance and Sexual Activity    Alcohol use: Yes    Drug use: Never    Sexual activity: Yes      Allergies:  Patient has no known allergies.     Outpatient Medications as of 3/13/2025   Medication    atorvastatin (LIPITOR) 10 MG tablet    blood sugar diagnostic (FREESTYLE LITE STRIPS) Strp    blood sugar diagnostic Strp    blood-glucose meter kit    lancets (FREESTYLE LANCETS) 28 gauge Misc    lancets Misc    lisinopriL 10 MG tablet    lisinopriL 10 MG tablet    metFORMIN (GLUCOPHAGE-XR) 500 MG ER 24hr tablet     No current facility-administered medications on file as of 3/13/2025.      Review of Systems    Constitutional:  Negative for fever, chills, weight loss, weight gain and night sweats.   HENT:  Negative for postnasal drip, voice change and congestion.    Eyes:  Negative for redness and itching.   Respiratory:  Negative for apnea, cough, sputum production, choking, wheezing and previous hospitialization due to pulmonary problems.    Cardiovascular:  Negative for chest pain and palpitations.   Genitourinary:  Negative for difficulty urinating and hematuria.   Endocrine:  Negative for polydipsia, polyphagia and polyuria.    Musculoskeletal:  Negative for gait problem, joint swelling and myalgias.   Skin:  Negative for rash.   Gastrointestinal:  Negative for abdominal pain.   Neurological:  Negative for syncope, weakness and headaches.   Hematological:  Negative for adenopathy. Does not bruise/bleed easily and no excessive bruising.   Psychiatric/Behavioral:  Negative for confusion and sleep disturbance. The patient is not nervous/anxious.       Previous Reports Reviewed:   ER records, historical medical records, lab reports, nursing home notes, office notes, operative reports, radiology reports, referral letter/letters and x-ray reports   The following portions of the patient's history were reviewed and updated as appropriate: allergies, current medications, past family history, past medical history, past social history, past surgical history and problem list.    Objective:      BP (!) 136/90 (BP Location: Left arm, Patient Position: Sitting)   Pulse 88   Wt 89.4 kg (197 lb)   SpO2 99%   BMI 29.09 kg/m²   Body mass index is 29.09 kg/m².     Physical Exam   Constitutional: He is oriented to person, place, and time. He appears well-developed and well-nourished. No distress.   HENT:   Head: Normocephalic.   Right Ear: External ear normal.   Left Ear: External ear normal.   Mouth/Throat: Oropharynx is clear and moist. Mallampati Score: II.   Neck: No thyromegaly present.   Cardiovascular: Normal rate, regular  rhythm, normal heart sounds and intact distal pulses. Exam reveals no gallop and no friction rub.   No murmur heard.  Pulmonary/Chest: Normal expansion, symmetric chest wall expansion and effort normal. He has no wheezes. He has no rhonchi. He has no rales.   Abdominal: Soft. Bowel sounds are normal. He exhibits no distension. There is no abdominal tenderness.   Musculoskeletal:         General: No tenderness, deformity or edema. Normal range of motion.      Cervical back: Normal range of motion and neck supple.   Lymphadenopathy: No supraclavicular adenopathy is present.     He has no cervical adenopathy.     He has no axillary adenopathy.   Neurological: He is alert and oriented to person, place, and time. No cranial nerve deficit.   Skin: Skin is warm and dry. No rash noted.   Psychiatric: He has a normal mood and affect.   Nursing note and vitals reviewed.     Personal Review of Relevant Diagnostic Studies:  I have personally reviewed and interpreted the following labs/studies/images.  PS2020:  PSG with AHI of 33  CPAP Titration   CPAP Recording:  Days:   (only 4 nights without since getting it)  Avg hours / night:  > 5  Avg AHI:  < 4    Assessment:       1. Obstructive sleep apnea syndrome            Impression:  ROSA on CPAP.    Plan:     Continue present PAP therapy  Pt to call if they have any trouble with their machines  Discussed with pt about signs and symptoms to watch for  Will refill supplies as needed  Have encouraged pt to continue to work on diet, weight loss and exercise  Encouraged continued smoking cessation.  Continue to work on losing weight.  RTC 1 year      No follow-ups on file.    Orders Placed This Visit:  No orders of the defined types were placed in this encounter.        Jon Starks MD  [facility]  2025